# Patient Record
Sex: FEMALE | Race: WHITE | NOT HISPANIC OR LATINO | Employment: FULL TIME | URBAN - METROPOLITAN AREA
[De-identification: names, ages, dates, MRNs, and addresses within clinical notes are randomized per-mention and may not be internally consistent; named-entity substitution may affect disease eponyms.]

---

## 2020-11-05 ENCOUNTER — TRANSCRIBE ORDERS (OUTPATIENT)
Dept: ADMINISTRATIVE | Facility: HOSPITAL | Age: 25
End: 2020-11-05

## 2020-11-05 DIAGNOSIS — M77.8: Primary | ICD-10-CM

## 2021-01-04 ENCOUNTER — APPOINTMENT (OUTPATIENT)
Dept: NEUROLOGY | Facility: HOSPITAL | Age: 26
End: 2021-01-04

## 2021-02-09 ENCOUNTER — TRANSCRIBE ORDERS (OUTPATIENT)
Dept: PHYSICAL THERAPY | Facility: CLINIC | Age: 26
End: 2021-02-09

## 2021-02-09 ENCOUNTER — TREATMENT (OUTPATIENT)
Dept: PHYSICAL THERAPY | Facility: CLINIC | Age: 26
End: 2021-02-09

## 2021-02-09 DIAGNOSIS — M25.531 RIGHT WRIST PAIN: ICD-10-CM

## 2021-02-09 DIAGNOSIS — Z47.89 ORTHOPEDIC AFTERCARE: ICD-10-CM

## 2021-02-09 DIAGNOSIS — G56.01 CARPAL TUNNEL SYNDROME OF RIGHT WRIST: Primary | ICD-10-CM

## 2021-02-09 DIAGNOSIS — Z98.890 S/P CARPAL TUNNEL RELEASE: Primary | ICD-10-CM

## 2021-02-09 PROCEDURE — 97110 THERAPEUTIC EXERCISES: CPT | Performed by: PHYSICAL THERAPIST

## 2021-02-09 PROCEDURE — A9999 DME SUPPLY OR ACCESSORY, NOS: HCPCS | Performed by: PHYSICAL THERAPIST

## 2021-02-09 PROCEDURE — 97161 PT EVAL LOW COMPLEX 20 MIN: CPT | Performed by: PHYSICAL THERAPIST

## 2021-02-10 NOTE — PROGRESS NOTES
Physical Therapy Initial Evaluation and Plan of Care        Patient: Julia Blunt   : 1995  Diagnosis/ICD-10 Code:  Carpal tunnel syndrome of right wrist [G56.01]  Referring practitioner: Diane Hamilton MD  Date of Initial Visit: 2021  Today's Date: 2021  Patient seen for 1 sessions           Subjective Evaluation    History of Present Illness  Date of onset: 10/14/2020  Date of surgery: 2021  Mechanism of injury: Work related injury. Repetitive stress issue working at a [employer] in [place], [place] as . She has been employed there about 1 and 1/2 years.  Symptoms became severe in 2020.  EMG/NCS test + for CTS. S/P CTR on 2021.  Sent to PT for evaluation and instruction in HEP, provider thinks she should be OK with a HEP at this point.     Subjective comment: S/P right CTR   Patient Occupation:    Precautions and Work Restrictions: Light Duty 10 lbs restriction with both hand, 5 lbs with right handQuality of life: good    Pain  Current pain ratin  At best pain ratin  At worst pain ratin  Location: Intermittent Right anterior wrist/palm pain.   Quality: dull ache, throbbing and tight  Relieving factors: rest and support  Aggravating factors: lifting, movement, repetitive movement and sleeping  Progression: improved (Numbness improved since surgery)    Hand dominance: right             Objective          Observations     Right Wrist/Hand   Positive for edema (Mild at surgery site.) and incision (Eschar present, healing well).       Tenderness     Additional Tenderness Details  Volar wrist/surgery site mildly tender.    Neurological Testing     Sensation     Wrist/Hand     Right   Intact: light touch, pin prick and sharp/dull discrimination    Active Range of Motion   Cervical/Thoracic Spine   Normal active range of motion  Left Shoulder   Normal active range of motion    Right Shoulder   Normal active range of motion    Left Elbow    Normal active range of motion    Right Elbow   Normal active range of motion    Right Wrist   Wrist flexion: 35 degrees with pain  Wrist extension: 30 degrees with pain  Radial deviation: 16 degrees   Ulnar deviation: 17 degrees     Passive Range of Motion     Right Wrist   Wrist flexion: WFL  Wrist extension: WFL  Radial deviation: WFL  Ulnar deviation: WFL     Strength/Myotome Testing     Left Wrist/Hand      (2nd hand position)   lbs: 10    Thumb Strength  Key/Lateral Pinch     Trial 1: 5 lbs  Palmar/Three-Point Pinch     Trial 1: 9 lbs    Right Wrist/Hand   Wrist extension: 5  Wrist flexion: 4+  Radial deviation: 5  Ulnar deviation: 5     (2nd hand position)   lbs: 5    Thumb Strength   Key/Lateral Pinch     Trial 1: 4 lbs  Palmar/Three-Point Pinch     Trial 1: 4 lbs    Tests   Cervical     Right   Positive ULTT2.     Right Wrist/Hand   Negative Phalen's sign and Tinel's sign (medial nerve).           Assessment & Plan     Assessment  Impairments: abnormal or restricted ROM, activity intolerance, impaired physical strength, lacks appropriate home exercise program and pain with function  Assessment details: Pt. is a 25 year old female with a history of work related CTS symptoms on the right. She is S/P  carpal tunnel release surgery on 1/29/2021.  The patient is doing well post surgically.  Neurological exam is normal today.  Problems:  discomfort, decreased strength, tender incision, positive neural tension.  The order was for instruction in home program.  Prognosis: good  Prognosis details:   Goals to be met today.  1.  The pt is independent with HEP.   Goal Met.  Functional Limitations: carrying objects, lifting, pushing, uncomfortable because of pain and reaching overhead  Plan  Therapy options: will be seen for skilled physical therapy services  Planned therapy interventions: home exercise program  Duration in visits: 1  Treatment plan discussed with: patient  Plan details: The patient was sent for  evaluation and instruction in HEP following a CTR surgery.  The patient is independent with HEP and does not need further skilled rehab at this time.  The pt is considered discharged from our services at this time.        Manual Therapy:         mins  12745;  Therapeutic Exercise:    15     mins  65195;     Neuromuscular Christiano:        mins  74898;    Therapeutic Activity:          mins  91325;     Gait Training:           mins  36608;     Ultrasound:          mins  31893;    Electrical Stimulation:         mins  24620 ( );  Iontophoresis          mins 89666   Traction          mins  94629  Fluidotherapy          mins  23117  Dry Needling          mins self-pay  Traction          mins  32638    Timed Treatment:   20   mins   Total Treatment:     35   mins    PT SIGNATURE: Javad Lima, PT   DATE TREATMENT INITIATED: 2/10/2021    Initial Certification  Certification Period: 5/11/2021  I certify that the therapy services are furnished while this patient is under my care.  The services outlined above are required by this patient, and will be reviewed every 90 days.     PHYSICIAN: Diane Hamilton MD      DATE:     Please sign and return via fax to [phone].. Thank you, [facility].

## 2022-12-21 ENCOUNTER — TELEPHONE (OUTPATIENT)
Dept: OBSTETRICS AND GYNECOLOGY | Facility: CLINIC | Age: 27
End: 2022-12-21

## 2022-12-21 NOTE — TELEPHONE ENCOUNTER
Caller: Julia Blunt    Relationship to patient: Self    Best call back number: [phone]    Patient is needing:PT NEEDS TO BE SCHEDULED NEW OB W/ US LMP 11/3  PT PREFERS [facility].

## 2023-04-19 ENCOUNTER — TELEPHONE (OUTPATIENT)
Dept: OBSTETRICS AND GYNECOLOGY | Facility: CLINIC | Age: 28
End: 2023-04-19
Payer: OTHER MISCELLANEOUS

## 2023-04-19 NOTE — TELEPHONE ENCOUNTER
Discussed patient's request for transfer of care with Dr. Schmidt and he agreed. Left voice message informing patient that we have her scheduled with him for new OB visit @ 4611 4/26/23. Requested she call our office to confirm.

## 2023-04-19 NOTE — TELEPHONE ENCOUNTER
Pt calling to request appt as transfer new ob from [facility]. Her [facility] records are in Epic. Pt doesn't care what provider she sees if approved. Pt is currently about 24 wks.

## 2023-04-26 ENCOUNTER — INITIAL PRENATAL (OUTPATIENT)
Dept: OBSTETRICS AND GYNECOLOGY | Facility: CLINIC | Age: 28
End: 2023-04-26
Payer: COMMERCIAL

## 2023-04-26 VITALS — SYSTOLIC BLOOD PRESSURE: 98 MMHG | DIASTOLIC BLOOD PRESSURE: 60 MMHG | WEIGHT: 122.6 LBS

## 2023-04-26 DIAGNOSIS — O34.219 HISTORY OF CESAREAN SECTION COMPLICATING PREGNANCY: ICD-10-CM

## 2023-04-26 DIAGNOSIS — D50.8 IRON DEFICIENCY ANEMIA SECONDARY TO INADEQUATE DIETARY IRON INTAKE: ICD-10-CM

## 2023-04-26 DIAGNOSIS — Z34.90 PREGNANCY, UNSPECIFIED GESTATIONAL AGE: Primary | ICD-10-CM

## 2023-04-26 DIAGNOSIS — Z12.4 SCREENING FOR CERVICAL CANCER: ICD-10-CM

## 2023-04-26 DIAGNOSIS — Z87.59 HISTORY OF GESTATIONAL HYPERTENSION: ICD-10-CM

## 2023-04-26 DIAGNOSIS — Z34.82 PRENATAL CARE, SUBSEQUENT PREGNANCY, SECOND TRIMESTER: ICD-10-CM

## 2023-04-26 LAB
EXPIRATION DATE: 0
GLUCOSE UR STRIP-MCNC: NEGATIVE MG/DL
Lab: 0
PROT UR STRIP-MCNC: NEGATIVE MG/DL

## 2023-04-26 PROCEDURE — 0501F PRENATAL FLOW SHEET: CPT | Performed by: OBSTETRICS & GYNECOLOGY

## 2023-04-26 RX ORDER — PRENATAL VIT NO.126/IRON/FOLIC 28MG-0.8MG
TABLET ORAL DAILY
COMMUNITY

## 2023-04-26 RX ORDER — ASPIRIN 81 MG/1
81 TABLET, CHEWABLE ORAL DAILY
Qty: 30 TABLET | Refills: 6 | Status: SHIPPED | OUTPATIENT
Start: 2023-04-26

## 2023-04-26 RX ORDER — VITAMIN C, CALCIUM, IRON, VITAMIN D3, VITAMIN E, VITAMIN B1, VITAMIN B2, VITAMIN B3, VITAMIN B6, FOLIC ACID, IODINE, ZINC, COPPER, DOCUSATE SODIUM, DOCOSAHEXAENOIC ACID (DHA) 27-1-50 MG
1 KIT ORAL DAILY
Qty: 30 EACH | Refills: 11 | Status: SHIPPED | OUTPATIENT
Start: 2023-04-26 | End: 2024-04-25

## 2023-04-26 RX ORDER — FERROUS SULFATE 325(65) MG
325 TABLET ORAL
COMMUNITY

## 2023-04-26 NOTE — PROGRESS NOTES
Initial ob visit     CC- Here for care of pregnancy        Julia Blunt is a 27 y.o. female, , who presents for her first obstetrical visit and is a transfer from [facility].  Her last LMP was Patient's last menstrual period was 2022.. Her GALE is 8/10/2023, by Last Menstrual Period. Current GA is 24w6d.     Initial positive test date : 22, UPT        Her periods are: every 4 weeks and every 5 weeks  Prior obstetric issues: pre-eclampsia  Patient's past medical history is significant for: anxiety.  Family history of genetic issues (includes FOB): No  Prior infections concerning in pregnancy (Rash, fever in last 2 weeks): Yes, on left hand  Varicella Hx - unknown   Prior testing for Cystic Fibrosis Carrier or Sickle Cell Trait- None  Prepregnancy BMI - There is no height or weight on file to calculate BMI.  History of STD: no  Hx of HSV for patient or partner: no  Ultrasound Today: no    OB History    Para Term  AB Living   3         2   SAB IAB Ectopic Molar Multiple Live Births                    # Outcome Date GA Lbr Naman/2nd Weight Sex Delivery Anes PTL Lv   3 Current            2             1                 Additional Pertinent History   Last Pap :  Result: unknown  HPV: unknown      Last Completed Pap Smear     This patient has no relevant Health Maintenance data.        History of abnormal Pap smear: no  Family history of uterine, colon, breast, or ovarian cancer: no  Feelings of Anxiety or Depression: yes - anxiety  Tobacco Usage?: No   Alcohol/Drug Use?: NO  Over the age of 35 at delivery: no  Desires Genetic Screening: already done  Flu Status: Already given in current flu season    PMH    Current Outpatient Medications:   •  ferrous sulfate 325 (65 FE) MG tablet, Take 1 tablet by mouth Daily With Breakfast., Disp: , Rfl:   •  prenatal vitamin (prenatal, CLASSIC, vitamin) tablet, Take  by mouth Daily., Disp: , Rfl:   •  aspirin 81 MG  chewable tablet, Chew 1 tablet Daily., Disp: 30 tablet, Rfl: 6  •  hydrocortisone 2.5 % cream, Apply 1 application topically to the appropriate area as directed 3 (Three) Times a Day., Disp: 20 g, Rfl: 1  •  Prenat w/o A-FeCbGl-DSS-FA-DHA (PNV OB+DHA) 27-1 & 250 MG misc, Take 1 tablet by mouth Daily., Disp: 30 each, Rfl: 11     Past Medical History:   Diagnosis Date   • Anxiety    • Pregnancy     X2        Past Surgical History:   Procedure Laterality Date   • CARPAL TUNNEL RELEASE Bilateral    •  SECTION  2017   •  SECTION  2022   • CHOLECYSTECTOMY         Review of Systems   Review of Systems  Patient Reports: anxiety and left hand rash  Patient Denies: Nausea, Nausea and vomiting, Spotting, Heavy bleeding, Cramping and Fatigue  All systems reviewed and otherwise normal.    I have reviewed and agree with the HPI, ROS, and historical information as entered above. Duy Schmidt MD    BP 98/60   Wt 55.6 kg (122 lb 9.6 oz)   LMP 2022     The additional following portions of the patient's history were reviewed and updated as appropriate: allergies, current medications, past family history, past medical history, past social history, past surgical history and problem list.    Physical Exam  General:  well developed; well nourished  no acute distress   Chest/Respiratory: No labored breathing, normal respiratory effort, normal appearance, no respiratory noises noted   Heart:  normal rate, regular rhythm,  no murmurs, rubs, or gallops   Thyroid: normal to inspection and palpation   Breasts:  Not performed.   Abdomen: soft, non-tender; no masses  fundus firm and non-tender   Pelvis: Not performed.        Assessment and Plan    Problem List Items Addressed This Visit            Pregnancy - Primary    Overview     G3, P2   H/o C section x 2 at 37 wks for preeclampsia  ASA 81 mg rx   Transfer from  at 24w 6d           Iron deficiency anemia secondary to inadequate  dietary iron intake    Overview     2023; hematocrit 24.8%;  On iron sulfate 325 mg daily.    Iron profile consistent with iron deficiency.  3/13/2023; hematocrit improved to 30.6%         Relevant Medications    ferrous sulfate 325 (65 FE) MG tablet    Screening for cervical cancer    Overview     Last Pap ?? in [place]. Unsure date.          History of gestational hypertension    Overview     H/o Gestational HTN; delivered at 37 wks x 2  Has not been on aspirin this pregnancy.  2023 Rx ASA 81 mg daily.         History of  section complicating pregnancy    Overview     H/o C section x 2 at 37 wk; [place].   Need records.         Other Visit Diagnoses     Prenatal care, subsequent pregnancy, second trimester        Relevant Orders    POC Protein, Urine, Qualitative, Dipstick (Completed)    POC Glucose, Urine, Qualitative, Dipstick (Completed)          1. Pregnancy at 24w6d; transfer prenatal care from [facility].  Need prenatal records.    2. History of  x2.  Will need repeat .  3. History of gestational hypertension or preeclampsia.  Need records.  Rx ASA 81 mg daily.  4. Deficiency anemia.  Continue iron sulfate 325 mg daily.  5. Rx prenatal vitamins sent to pharmacy.  6. Reviewed routine prenatal care with the office and educational materials given  7. Lab(s) Ordered  8. Medication(s) Ordered  9. discussed baby aspirin from 10-36 weeks for prevention of preeclampsia    10. Rash on left fifth digit; Contact dermatitis; Rx 2.5% HC cream.   Return in about 3 weeks (around 2023) for One hour Glucose Screen.      Duy Schmidt MD  2023

## 2023-06-02 ENCOUNTER — ROUTINE PRENATAL (OUTPATIENT)
Dept: OBSTETRICS AND GYNECOLOGY | Facility: CLINIC | Age: 28
End: 2023-06-02

## 2023-06-02 VITALS — DIASTOLIC BLOOD PRESSURE: 62 MMHG | WEIGHT: 128.6 LBS | SYSTOLIC BLOOD PRESSURE: 102 MMHG

## 2023-06-02 DIAGNOSIS — D50.8 IRON DEFICIENCY ANEMIA SECONDARY TO INADEQUATE DIETARY IRON INTAKE: ICD-10-CM

## 2023-06-02 DIAGNOSIS — Z34.90 PREGNANCY, UNSPECIFIED GESTATIONAL AGE: Primary | ICD-10-CM

## 2023-06-02 DIAGNOSIS — Z34.93 PRENATAL CARE IN THIRD TRIMESTER: ICD-10-CM

## 2023-06-02 DIAGNOSIS — Z87.59 HISTORY OF GESTATIONAL HYPERTENSION: ICD-10-CM

## 2023-06-02 DIAGNOSIS — L29.9 ITCHING OF BOTH HANDS: ICD-10-CM

## 2023-06-02 DIAGNOSIS — O12.03 EDEMA IN PREGNANCY IN THIRD TRIMESTER: ICD-10-CM

## 2023-06-02 DIAGNOSIS — Z12.4 SCREENING FOR CERVICAL CANCER: ICD-10-CM

## 2023-06-02 DIAGNOSIS — O34.219 HISTORY OF CESAREAN SECTION COMPLICATING PREGNANCY: ICD-10-CM

## 2023-06-02 DIAGNOSIS — R51.9 NONINTRACTABLE HEADACHE, UNSPECIFIED CHRONICITY PATTERN, UNSPECIFIED HEADACHE TYPE: ICD-10-CM

## 2023-06-02 LAB
EXPIRATION DATE: 0
GLUCOSE UR STRIP-MCNC: NEGATIVE MG/DL
Lab: 0
PROT UR STRIP-MCNC: NEGATIVE MG/DL

## 2023-06-02 NOTE — PROGRESS NOTES
OB FOLLOW UP  CC- Here for care of pregnancy        Julia Blunt is a 27 y.o.  30w1d patient being seen today for her obstetrical follow up. Patient reports regular contractions everyday last week , swelling in the upper and lower extremities, patient states edema is painful. Headaches that are relieved with tylenol at times but not every time, visual changes are accompanied with a headache, itching in the hands and feet that is worse at night and vaginal pressure/pain.    Patient undergoing Glucola testing today. She is due for her testing at 3:15.       MBT: unknown patient was a 24 week transfer from , documents from  do not show that a ABO or RH was complete, will have these labs done today.   Rhogam: unknown if needed currently   28 week packet: reviewed with patient , counseled on fetal movement , pediatrician list reviewed, breast pump discussed and childbirth classes reviewed  TDAP: given today  Flu Status: Declines  Ultrasound Today: No    Her prenatal care is complicated by (and status) :    Patient Active Problem List   Diagnosis   • Pregnancy   • Iron deficiency anemia secondary to inadequate dietary iron intake   • Screening for cervical cancer   • History of gestational hypertension   • History of  section complicating pregnancy         ROS -   Patient Reports : Vision Changes, Headaches, Contractions and edema, itching in hands and feet.   Patient Denies: Loss of Fluid, Vaginal Spotting, Nausea , Vomiting  and Epigastric pain  Fetal Movement : normal    The additional following portions of the patient's history were reviewed and updated as appropriate: allergies, current medications, past family history, past medical history, past social history, past surgical history and problem list.    I have reviewed and agree with the HPI, ROS, and historical information as entered above. Anu Andrade, APRN      /62   Wt 58.3 kg (128 lb 9.6 oz)   LMP 2022         EXAM:      Prenatal Vitals  BP: 102/62  Weight: 58.3 kg (128 lb 9.6 oz)   Fetal Heart Rate: NST               Urine Glucose Read-only: Negative  Urine Protein Read-only: Negative       Assessment and Plan    Problem List Items Addressed This Visit        Genitourinary and Reproductive     Screening for cervical cancer    Overview     Last Pap ?? in [place]. Unsure date.             Gravid and     Pregnancy - Primary    Overview     G3, P2   H/o C section x 2 at 37 wks for preeclampsia  ASA 81 mg rx   Transfer from [facility] at 24w 6d           Relevant Orders    POC Glucose, Urine, Qualitative, Dipstick (Completed)    POC Protein, Urine, Qualitative, Dipstick (Completed)    History of gestational hypertension    Overview     H/o Gestational HTN; delivered at 37 wks x 2  Has not been on aspirin this pregnancy.  2023 Rx ASA 81 mg daily.         History of  section complicating pregnancy    Overview     H/o C section x 2 at 37 wk; [place].   Need records.             Hematology and Neoplasia    Iron deficiency anemia secondary to inadequate dietary iron intake    Overview     2023; hematocrit 24.8%;  On iron sulfate 325 mg daily.    Iron profile consistent with iron deficiency.  3/13/2023; hematocrit improved to 30.6%         Relevant Medications    ferrous sulfate 325 (65 FE) MG tablet   Other Visit Diagnoses     Prenatal care in third trimester        Relevant Orders    Bile Acids, Total    Antibody Screen    CBC (No Diff)    Comprehensive Metabolic Panel    Gestational Screen 1 Hr ([facility])    Itching of both hands        Relevant Orders    Bile Acids, Total    Nonintractable headache, unspecified chronicity pattern, unspecified headache type        Edema in pregnancy in third trimester              1. Pregnancy at 30w1d, NST >20 minutes baseline 140 reactive 15 x 15 no contractions/decelerations  2. 1 hr Glucola, CBC, and antibody screen today  and TDAP given today  3. Fetal movement/PTL or  Labor precautions  4. Lab(s) Ordered  5. Patient is on Prenatal vitamins  6. Reviewed Pre-eclampsia signs/symptoms  7. Discussed bASA for PIH prevention from 12 to 36wk  8. Activity and Exercise discussed.  Return for Next scheduled follow up.    Anu Andrade, APRN  06/02/2023

## 2023-06-04 LAB
ALBUMIN SERPL-MCNC: 3.4 G/DL (ref 3.9–5)
ALBUMIN/GLOB SERPL: 1.4 {RATIO} (ref 1.2–2.2)
ALP SERPL-CCNC: 101 IU/L (ref 44–121)
ALT SERPL-CCNC: 12 IU/L (ref 0–32)
AST SERPL-CCNC: 18 IU/L (ref 0–40)
BILE AC SERPL-SCNC: 6.8 UMOL/L (ref 0–10)
BILIRUB SERPL-MCNC: 0.4 MG/DL (ref 0–1.2)
BLD GP AB SCN SERPL QL: NEGATIVE
BUN SERPL-MCNC: 6 MG/DL (ref 6–20)
BUN/CREAT SERPL: 12 (ref 9–23)
CALCIUM SERPL-MCNC: 7.7 MG/DL (ref 8.7–10.2)
CHLORIDE SERPL-SCNC: 103 MMOL/L (ref 96–106)
CO2 SERPL-SCNC: 21 MMOL/L (ref 20–29)
CREAT SERPL-MCNC: 0.51 MG/DL (ref 0.57–1)
EGFRCR SERPLBLD CKD-EPI 2021: 130 ML/MIN/1.73
ERYTHROCYTE [DISTWIDTH] IN BLOOD BY AUTOMATED COUNT: 17.8 % (ref 11.7–15.4)
GLOBULIN SER CALC-MCNC: 2.5 G/DL (ref 1.5–4.5)
GLUCOSE 1H P 50 G GLC PO SERPL-MCNC: 135 MG/DL (ref 70–139)
GLUCOSE SERPL-MCNC: 136 MG/DL (ref 70–99)
HCT VFR BLD AUTO: 24.1 % (ref 34–46.6)
HGB BLD-MCNC: 7.6 G/DL (ref 11.1–15.9)
MCH RBC QN AUTO: 20.9 PG (ref 26.6–33)
MCHC RBC AUTO-ENTMCNC: 31.5 G/DL (ref 31.5–35.7)
MCV RBC AUTO: 66 FL (ref 79–97)
NRBC BLD AUTO-RTO: 1 % (ref 0–0)
PLATELET # BLD AUTO: 211 X10E3/UL (ref 150–450)
POTASSIUM SERPL-SCNC: 3.4 MMOL/L (ref 3.5–5.2)
PROT SERPL-MCNC: 5.9 G/DL (ref 6–8.5)
RBC # BLD AUTO: 3.63 X10E6/UL (ref 3.77–5.28)
SODIUM SERPL-SCNC: 136 MMOL/L (ref 134–144)
WBC # BLD AUTO: 7.5 X10E3/UL (ref 3.4–10.8)

## 2023-06-19 PROBLEM — E87.6 HYPOKALEMIA: Status: ACTIVE | Noted: 2023-06-19

## 2023-06-20 PROBLEM — E87.6 HYPOKALEMIA DUE TO EXCESSIVE GASTROINTESTINAL LOSS OF POTASSIUM: Status: ACTIVE | Noted: 2023-06-20

## 2023-07-14 PROBLEM — O26.849 UTERINE SIZE DATE DISCREPANCY PREGNANCY: Status: ACTIVE | Noted: 2023-07-14

## 2023-07-24 ENCOUNTER — ROUTINE PRENATAL (OUTPATIENT)
Dept: OBSTETRICS AND GYNECOLOGY | Facility: CLINIC | Age: 28
End: 2023-07-24
Payer: COMMERCIAL

## 2023-07-24 VITALS — SYSTOLIC BLOOD PRESSURE: 114 MMHG | DIASTOLIC BLOOD PRESSURE: 60 MMHG | BODY MASS INDEX: 25.39 KG/M2 | WEIGHT: 134.4 LBS

## 2023-07-24 DIAGNOSIS — Z34.93 PRENATAL CARE IN THIRD TRIMESTER: ICD-10-CM

## 2023-07-24 DIAGNOSIS — O34.219 HISTORY OF CESAREAN SECTION COMPLICATING PREGNANCY: ICD-10-CM

## 2023-07-24 DIAGNOSIS — O26.849 UTERINE SIZE DATE DISCREPANCY PREGNANCY: ICD-10-CM

## 2023-07-24 DIAGNOSIS — Z34.90 PREGNANCY, UNSPECIFIED GESTATIONAL AGE: Primary | ICD-10-CM

## 2023-07-24 DIAGNOSIS — Z3A.37 37 WEEKS GESTATION OF PREGNANCY: ICD-10-CM

## 2023-07-24 DIAGNOSIS — D50.8 IRON DEFICIENCY ANEMIA SECONDARY TO INADEQUATE DIETARY IRON INTAKE: ICD-10-CM

## 2023-07-24 DIAGNOSIS — Z87.59 HISTORY OF GESTATIONAL HYPERTENSION: ICD-10-CM

## 2023-07-24 LAB
EXPIRATION DATE: 0
GLUCOSE UR STRIP-MCNC: NEGATIVE MG/DL
Lab: 0
PROT UR STRIP-MCNC: NEGATIVE MG/DL

## 2023-07-24 PROCEDURE — 0502F SUBSEQUENT PRENATAL CARE: CPT | Performed by: OBSTETRICS & GYNECOLOGY

## 2023-07-24 NOTE — PROGRESS NOTES
"    OB FOLLOW UP  CC- Here for care of pregnancy        Julia Blunt is a 28 y.o.  37w4d patient being seen today for her obstetrical follow up visit. Patient reports daily headaches with vision changes (\"flashing lights with floating orbs\"); no medication helps alleviate. Trace BLE edema today, but when patient is on her feet, it is worse (and painful). Also reports daily heartburn that varies in strength (depending on diet), vulvar swelling, increased pelvic pressure, [facility], and \"intense\" lower back pains that radiate down BLE (at worst, an 8 on 0-10 pain scale).    Her prenatal care is complicated by (and status) :   Patient Active Problem List   Diagnosis    Pregnancy    Iron deficiency anemia secondary to inadequate dietary iron intake    Screening for cervical cancer    History of gestational hypertension    History of  section complicating pregnancy    Hypokalemia    Hypokalemia due to excessive gastrointestinal loss of potassium    Uterine size date discrepancy pregnancy       GBS Status:   Group B Strep Culture   Date Value Ref Range Status   2023 No Group B Streptococcus isolated  Final         Allergies   Allergen Reactions    Nickel Irritability          Flu Status:  not currently flu season  Her Delivery Plan is:  repeat  8/3 at 1030am     US today: yes  Non Stress Test: No.    ROS -   Patient Reports :  see above  Patient Denies: Loss of Fluid, Vaginal Spotting, Nausea , and Vomiting   Fetal Movement : normal  All other systems reviewed and are negative.       The additional following portions of the patient's history were reviewed and updated as appropriate: allergies, current medications, past family history, past medical history, past social history, past surgical history, and problem list.    I have reviewed and agree with the HPI, ROS, and historical information as entered above. Duy Schmidt MD       EXAM:     Prenatal Vitals  BP: 114/60  Weight: 61 kg " (134 lb 6.4 oz)   Fetal Heart Rate: 132 US              Urine Glucose Read-only: Negative  Urine Protein Read-only: Negative           Assessment and Plan    Problem List Items Addressed This Visit              Pregnancy - Primary    Overview     G3, P2   H/o C section x 2 at 37 wks for preeclampsia  ASA 81 mg rx   Transfer from  at 24w 6d           Relevant Orders    POC Glucose, Urine, Qualitative, Dipstick (Completed)    POC Protein, Urine, Qualitative, Dipstick (Completed)    Iron deficiency anemia secondary to inadequate dietary iron intake    Overview     2023; hematocrit 24.8%;  On iron sulfate 325 mg daily.    Iron profile consistent with iron deficiency.  3/13/2023; hematocrit improved to 30.6%  2023; hematocrit 26.1.  IV iron infusion 6/19/2023 x2  Increase iron to twice daily.         Relevant Medications    ferrous sulfate 325 (65 FE) MG tablet    History of gestational hypertension    Overview     H/o Gestational HTN; delivered at 37 wks x 2  Has not been on aspirin this pregnancy.  2023 Rx ASA 81 mg daily.         History of  section complicating pregnancy    Overview     H/o C section x 2 at 37 wk; [place].   Need records.          Uterine size date discrepancy pregnancy    Overview     2023 measures small for dates.  U/s scheduled.           Other Visit Diagnoses       Prenatal care in third trimester        Relevant Orders    POC Glucose, Urine, Qualitative, Dipstick (Completed)    POC Protein, Urine, Qualitative, Dipstick (Completed)            Pregnancy at 37w4d  LGA on u/s today; EFW 71%; AC 98%.   Fetal status reassuring. BPP 8/8 with normal TIERNEY.   Reviewed Pre-eclampsia signs/symptoms  Reviewed upcoming c/s and PAT instructions with patient. Arrival time and NPO status reviewed.   Delivery options reviewed with patient  Signs of labor reviewed  Kick counts reviewed  Activity and Exercise discussed.  Return in about 1 week (around 2023) for Next  scheduled follow up.    Duy Schmidt MD  07/24/2023

## 2023-07-31 ENCOUNTER — ROUTINE PRENATAL (OUTPATIENT)
Dept: OBSTETRICS AND GYNECOLOGY | Facility: CLINIC | Age: 28
End: 2023-07-31
Payer: COMMERCIAL

## 2023-07-31 ENCOUNTER — PRE-ADMISSION TESTING (OUTPATIENT)
Dept: PREADMISSION TESTING | Facility: HOSPITAL | Age: 28
End: 2023-07-31
Payer: COMMERCIAL

## 2023-07-31 ENCOUNTER — PREP FOR SURGERY (OUTPATIENT)
Dept: OTHER | Facility: HOSPITAL | Age: 28
End: 2023-07-31
Payer: COMMERCIAL

## 2023-07-31 VITALS — BODY MASS INDEX: 26.26 KG/M2 | SYSTOLIC BLOOD PRESSURE: 110 MMHG | WEIGHT: 139 LBS | DIASTOLIC BLOOD PRESSURE: 64 MMHG

## 2023-07-31 VITALS — WEIGHT: 137.57 LBS | BODY MASS INDEX: 27.73 KG/M2 | HEIGHT: 59 IN

## 2023-07-31 DIAGNOSIS — Z98.891 PREVIOUS CESAREAN SECTION: Primary | ICD-10-CM

## 2023-07-31 DIAGNOSIS — Z3A.38 38 WEEKS GESTATION OF PREGNANCY: Primary | ICD-10-CM

## 2023-07-31 DIAGNOSIS — Z87.59 HISTORY OF GESTATIONAL HYPERTENSION: ICD-10-CM

## 2023-07-31 DIAGNOSIS — L29.9 ITCHING OF BOTH HANDS: ICD-10-CM

## 2023-07-31 DIAGNOSIS — R51.9 NONINTRACTABLE HEADACHE, UNSPECIFIED CHRONICITY PATTERN, UNSPECIFIED HEADACHE TYPE: ICD-10-CM

## 2023-07-31 DIAGNOSIS — Z98.891 PREVIOUS CESAREAN SECTION: ICD-10-CM

## 2023-07-31 DIAGNOSIS — Z34.93 PRENATAL CARE IN THIRD TRIMESTER: ICD-10-CM

## 2023-07-31 DIAGNOSIS — O34.219 HISTORY OF CESAREAN SECTION COMPLICATING PREGNANCY: ICD-10-CM

## 2023-07-31 LAB
ABO GROUP BLD: NORMAL
ALP SERPL-CCNC: 276 U/L (ref 39–117)
ALT SERPL W P-5'-P-CCNC: 13 U/L (ref 1–33)
AST SERPL-CCNC: 21 U/L (ref 1–32)
BILE AC SERPL-SCNC: 6 UMOL/L (ref 0–10)
BILIRUB SERPL-MCNC: 0.4 MG/DL (ref 0–1.2)
BLD GP AB SCN SERPL QL: NEGATIVE
CREAT SERPL-MCNC: 0.45 MG/DL (ref 0.57–1)
DEPRECATED RDW RBC AUTO: 68.2 FL (ref 37–54)
ERYTHROCYTE [DISTWIDTH] IN BLOOD BY AUTOMATED COUNT: 25.2 % (ref 12.3–15.4)
EXPIRATION DATE: 0
GLUCOSE UR STRIP-MCNC: NEGATIVE MG/DL
HCT VFR BLD AUTO: 34.7 % (ref 34–46.6)
HGB BLD-MCNC: 9.9 G/DL (ref 12–15.9)
LDH SERPL-CCNC: 295 U/L (ref 135–214)
Lab: 0
MCH RBC QN AUTO: 22 PG (ref 26.6–33)
MCHC RBC AUTO-ENTMCNC: 28.5 G/DL (ref 31.5–35.7)
MCV RBC AUTO: 76.9 FL (ref 79–97)
PLATELET # BLD AUTO: 211 10*3/MM3 (ref 140–450)
PMV BLD AUTO: 10.5 FL (ref 6–12)
PROT UR STRIP-MCNC: NEGATIVE MG/DL
RBC # BLD AUTO: 4.51 10*6/MM3 (ref 3.77–5.28)
RH BLD: POSITIVE
T&S EXPIRATION DATE: NORMAL
URATE SERPL-MCNC: 3.5 MG/DL (ref 2.4–5.7)
WBC NRBC COR # BLD: 7.9 10*3/MM3 (ref 3.4–10.8)

## 2023-07-31 PROCEDURE — 84550 ASSAY OF BLOOD/URIC ACID: CPT

## 2023-07-31 PROCEDURE — 82565 ASSAY OF CREATININE: CPT

## 2023-07-31 PROCEDURE — 84450 TRANSFERASE (AST) (SGOT): CPT

## 2023-07-31 PROCEDURE — 85027 COMPLETE CBC AUTOMATED: CPT

## 2023-07-31 PROCEDURE — 86901 BLOOD TYPING SEROLOGIC RH(D): CPT

## 2023-07-31 PROCEDURE — 82239 BILE ACIDS TOTAL: CPT

## 2023-07-31 PROCEDURE — 59025 FETAL NON-STRESS TEST: CPT

## 2023-07-31 PROCEDURE — 84460 ALANINE AMINO (ALT) (SGPT): CPT

## 2023-07-31 PROCEDURE — 82247 BILIRUBIN TOTAL: CPT

## 2023-07-31 PROCEDURE — 36415 COLL VENOUS BLD VENIPUNCTURE: CPT

## 2023-07-31 PROCEDURE — 86850 RBC ANTIBODY SCREEN: CPT

## 2023-07-31 PROCEDURE — 84075 ASSAY ALKALINE PHOSPHATASE: CPT

## 2023-07-31 PROCEDURE — 83615 LACTATE (LD) (LDH) ENZYME: CPT

## 2023-07-31 PROCEDURE — 86900 BLOOD TYPING SEROLOGIC ABO: CPT

## 2023-07-31 PROCEDURE — 0502F SUBSEQUENT PRENATAL CARE: CPT

## 2023-07-31 RX ORDER — SODIUM CHLORIDE, SODIUM LACTATE, POTASSIUM CHLORIDE, CALCIUM CHLORIDE 600; 310; 30; 20 MG/100ML; MG/100ML; MG/100ML; MG/100ML
125 INJECTION, SOLUTION INTRAVENOUS CONTINUOUS
Status: CANCELLED | OUTPATIENT
Start: 2023-07-31

## 2023-07-31 RX ORDER — OXYTOCIN/0.9 % SODIUM CHLORIDE 30/500 ML
85 PLASTIC BAG, INJECTION (ML) INTRAVENOUS ONCE
Status: CANCELLED | OUTPATIENT
Start: 2023-07-31 | End: 2023-07-31

## 2023-07-31 RX ORDER — CARBOPROST TROMETHAMINE 250 UG/ML
250 INJECTION, SOLUTION INTRAMUSCULAR AS NEEDED
Status: CANCELLED | OUTPATIENT
Start: 2023-07-31

## 2023-07-31 RX ORDER — KETOROLAC TROMETHAMINE 15 MG/ML
30 INJECTION, SOLUTION INTRAMUSCULAR; INTRAVENOUS ONCE
Status: CANCELLED | OUTPATIENT
Start: 2023-07-31 | End: 2023-07-31

## 2023-07-31 RX ORDER — MISOPROSTOL 100 UG/1
800 TABLET ORAL AS NEEDED
Status: CANCELLED | OUTPATIENT
Start: 2023-07-31

## 2023-07-31 RX ORDER — METHYLERGONOVINE MALEATE 0.2 MG/ML
200 INJECTION INTRAVENOUS ONCE AS NEEDED
Status: CANCELLED | OUTPATIENT
Start: 2023-07-31

## 2023-07-31 RX ORDER — OXYTOCIN/0.9 % SODIUM CHLORIDE 30/500 ML
650 PLASTIC BAG, INJECTION (ML) INTRAVENOUS ONCE
Status: CANCELLED | OUTPATIENT
Start: 2023-07-31 | End: 2023-07-31

## 2023-07-31 RX ORDER — ACETAMINOPHEN 500 MG
1000 TABLET ORAL ONCE
Status: CANCELLED | OUTPATIENT
Start: 2023-07-31 | End: 2023-07-31

## 2023-07-31 RX ORDER — TRISODIUM CITRATE DIHYDRATE AND CITRIC ACID MONOHYDRATE 500; 334 MG/5ML; MG/5ML
30 SOLUTION ORAL ONCE
Status: CANCELLED | OUTPATIENT
Start: 2023-07-31 | End: 2023-07-31

## 2023-08-02 ENCOUNTER — ANESTHESIA EVENT (OUTPATIENT)
Dept: LABOR AND DELIVERY | Facility: HOSPITAL | Age: 28
End: 2023-08-02
Payer: COMMERCIAL

## 2023-08-02 ENCOUNTER — ROUTINE PRENATAL (OUTPATIENT)
Dept: OBSTETRICS AND GYNECOLOGY | Facility: CLINIC | Age: 28
End: 2023-08-02
Payer: COMMERCIAL

## 2023-08-02 VITALS — WEIGHT: 139 LBS | BODY MASS INDEX: 28.07 KG/M2 | SYSTOLIC BLOOD PRESSURE: 108 MMHG | DIASTOLIC BLOOD PRESSURE: 62 MMHG

## 2023-08-02 DIAGNOSIS — O26.849 UTERINE SIZE DATE DISCREPANCY PREGNANCY: ICD-10-CM

## 2023-08-02 DIAGNOSIS — Z3A.38 38 WEEKS GESTATION OF PREGNANCY: Primary | ICD-10-CM

## 2023-08-02 DIAGNOSIS — Z87.59 HISTORY OF GESTATIONAL HYPERTENSION: ICD-10-CM

## 2023-08-02 DIAGNOSIS — D50.8 IRON DEFICIENCY ANEMIA SECONDARY TO INADEQUATE DIETARY IRON INTAKE: ICD-10-CM

## 2023-08-02 DIAGNOSIS — Z12.4 SCREENING FOR CERVICAL CANCER: ICD-10-CM

## 2023-08-02 DIAGNOSIS — O34.219 HISTORY OF CESAREAN SECTION COMPLICATING PREGNANCY: ICD-10-CM

## 2023-08-02 LAB
EXPIRATION DATE: 0
GLUCOSE UR STRIP-MCNC: NEGATIVE MG/DL
Lab: 0
PROT UR STRIP-MCNC: NEGATIVE MG/DL

## 2023-08-02 RX ORDER — SENNOSIDES 8.6 MG
650 CAPSULE ORAL EVERY 8 HOURS PRN
COMMUNITY
End: 2023-08-05 | Stop reason: HOSPADM

## 2023-08-03 ENCOUNTER — HOSPITAL ENCOUNTER (INPATIENT)
Facility: HOSPITAL | Age: 28
LOS: 2 days | Discharge: HOME | End: 2023-08-05
Attending: OBSTETRICS & GYNECOLOGY | Admitting: OBSTETRICS & GYNECOLOGY
Payer: COMMERCIAL

## 2023-08-03 ENCOUNTER — ANESTHESIA (OUTPATIENT)
Dept: LABOR AND DELIVERY | Facility: HOSPITAL | Age: 28
End: 2023-08-03
Payer: COMMERCIAL

## 2023-08-03 DIAGNOSIS — Z98.891 PREVIOUS CESAREAN SECTION: ICD-10-CM

## 2023-08-03 PROCEDURE — 25010000002 CEFAZOLIN IN DEXTROSE 2-4 GM/100ML-% SOLUTION

## 2023-08-03 PROCEDURE — 25010000002 KETOROLAC TROMETHAMINE PER 15 MG: Performed by: OBSTETRICS & GYNECOLOGY

## 2023-08-03 PROCEDURE — 0 MORPHINE PER 10 MG: Performed by: NURSE ANESTHETIST, CERTIFIED REGISTERED

## 2023-08-03 PROCEDURE — 25010000002 KETOROLAC TROMETHAMINE PER 15 MG

## 2023-08-03 PROCEDURE — 25010000002 ONDANSETRON PER 1 MG: Performed by: NURSE ANESTHETIST, CERTIFIED REGISTERED

## 2023-08-03 PROCEDURE — 25010000002 OXYTOCIN PER 10 UNITS: Performed by: NURSE ANESTHETIST, CERTIFIED REGISTERED

## 2023-08-03 PROCEDURE — 59510 CESAREAN DELIVERY: CPT | Performed by: OBSTETRICS & GYNECOLOGY

## 2023-08-03 PROCEDURE — 63710000001 DIPHENHYDRAMINE PER 50 MG: Performed by: NURSE ANESTHETIST, CERTIFIED REGISTERED

## 2023-08-03 PROCEDURE — 59025 FETAL NON-STRESS TEST: CPT

## 2023-08-03 PROCEDURE — C1765 ADHESION BARRIER: HCPCS | Performed by: OBSTETRICS & GYNECOLOGY

## 2023-08-03 PROCEDURE — 25010000002 FENTANYL CITRATE (PF) 50 MCG/ML SOLUTION: Performed by: NURSE ANESTHETIST, CERTIFIED REGISTERED

## 2023-08-03 PROCEDURE — 25010000002 DIPHENHYDRAMINE PER 50 MG: Performed by: NURSE ANESTHETIST, CERTIFIED REGISTERED

## 2023-08-03 PROCEDURE — 25010000002 MIDAZOLAM PER 1 MG: Performed by: NURSE ANESTHETIST, CERTIFIED REGISTERED

## 2023-08-03 DEVICE — ABSORBABLE ADHESION BARRIER
Type: IMPLANTABLE DEVICE | Site: UTERUS | Status: FUNCTIONAL
Brand: GYNECARE INTERCEED

## 2023-08-03 RX ORDER — CEFAZOLIN SODIUM 2 G/100ML
2 INJECTION, SOLUTION INTRAVENOUS ONCE
Status: COMPLETED | OUTPATIENT
Start: 2023-08-03 | End: 2023-08-03

## 2023-08-03 RX ORDER — TRISODIUM CITRATE DIHYDRATE AND CITRIC ACID MONOHYDRATE 500; 334 MG/5ML; MG/5ML
30 SOLUTION ORAL ONCE
Status: COMPLETED | OUTPATIENT
Start: 2023-08-03 | End: 2023-08-03

## 2023-08-03 RX ORDER — NALOXONE HCL 0.4 MG/ML
0.4 VIAL (ML) INJECTION
Status: ACTIVE | OUTPATIENT
Start: 2023-08-03 | End: 2023-08-04

## 2023-08-03 RX ORDER — ONDANSETRON 2 MG/ML
INJECTION INTRAMUSCULAR; INTRAVENOUS AS NEEDED
Status: DISCONTINUED | OUTPATIENT
Start: 2023-08-03 | End: 2023-08-03 | Stop reason: SURG

## 2023-08-03 RX ORDER — OXYCODONE HYDROCHLORIDE 5 MG/1
5 TABLET ORAL EVERY 4 HOURS PRN
Status: DISCONTINUED | OUTPATIENT
Start: 2023-08-03 | End: 2023-08-05 | Stop reason: HOSPADM

## 2023-08-03 RX ORDER — PROMETHAZINE HYDROCHLORIDE 12.5 MG/1
12.5 TABLET ORAL EVERY 4 HOURS PRN
Status: DISCONTINUED | OUTPATIENT
Start: 2023-08-03 | End: 2023-08-05 | Stop reason: HOSPADM

## 2023-08-03 RX ORDER — HYDROCORTISONE 25 MG/G
1 CREAM TOPICAL AS NEEDED
Status: DISCONTINUED | OUTPATIENT
Start: 2023-08-03 | End: 2023-08-05 | Stop reason: HOSPADM

## 2023-08-03 RX ORDER — ACETAMINOPHEN 325 MG/1
650 TABLET ORAL EVERY 6 HOURS
Status: DISCONTINUED | OUTPATIENT
Start: 2023-08-04 | End: 2023-08-05 | Stop reason: HOSPADM

## 2023-08-03 RX ORDER — METHYLERGONOVINE MALEATE 0.2 MG/ML
200 INJECTION INTRAVENOUS ONCE AS NEEDED
Status: DISCONTINUED | OUTPATIENT
Start: 2023-08-03 | End: 2023-08-03 | Stop reason: HOSPADM

## 2023-08-03 RX ORDER — OXYTOCIN/0.9 % SODIUM CHLORIDE 30/500 ML
PLASTIC BAG, INJECTION (ML) INTRAVENOUS AS NEEDED
Status: DISCONTINUED | OUTPATIENT
Start: 2023-08-03 | End: 2023-08-03 | Stop reason: SURG

## 2023-08-03 RX ORDER — NALBUPHINE HYDROCHLORIDE 10 MG/ML
3 INJECTION, SOLUTION INTRAMUSCULAR; INTRAVENOUS; SUBCUTANEOUS ONCE AS NEEDED
Status: DISCONTINUED | OUTPATIENT
Start: 2023-08-03 | End: 2023-08-03 | Stop reason: HOSPADM

## 2023-08-03 RX ORDER — OXYTOCIN/0.9 % SODIUM CHLORIDE 30/500 ML
650 PLASTIC BAG, INJECTION (ML) INTRAVENOUS ONCE
Status: DISCONTINUED | OUTPATIENT
Start: 2023-08-03 | End: 2023-08-03 | Stop reason: HOSPADM

## 2023-08-03 RX ORDER — ACETAMINOPHEN 500 MG
1000 TABLET ORAL ONCE
Status: COMPLETED | OUTPATIENT
Start: 2023-08-03 | End: 2023-08-03

## 2023-08-03 RX ORDER — OXYCODONE HYDROCHLORIDE 10 MG/1
10 TABLET ORAL EVERY 4 HOURS PRN
Status: DISCONTINUED | OUTPATIENT
Start: 2023-08-03 | End: 2023-08-05 | Stop reason: HOSPADM

## 2023-08-03 RX ORDER — GLYCOPYRROLATE 0.2 MG/ML
INJECTION INTRAMUSCULAR; INTRAVENOUS AS NEEDED
Status: DISCONTINUED | OUTPATIENT
Start: 2023-08-03 | End: 2023-08-03 | Stop reason: SURG

## 2023-08-03 RX ORDER — OXYTOCIN/0.9 % SODIUM CHLORIDE 30/500 ML
85 PLASTIC BAG, INJECTION (ML) INTRAVENOUS ONCE
Status: DISCONTINUED | OUTPATIENT
Start: 2023-08-03 | End: 2023-08-03 | Stop reason: HOSPADM

## 2023-08-03 RX ORDER — IBUPROFEN 600 MG/1
600 TABLET ORAL EVERY 6 HOURS
Status: DISCONTINUED | OUTPATIENT
Start: 2023-08-04 | End: 2023-08-05 | Stop reason: HOSPADM

## 2023-08-03 RX ORDER — DIPHENHYDRAMINE HYDROCHLORIDE 50 MG/ML
25 INJECTION INTRAMUSCULAR; INTRAVENOUS EVERY 4 HOURS PRN
Status: DISCONTINUED | OUTPATIENT
Start: 2023-08-03 | End: 2023-08-05 | Stop reason: HOSPADM

## 2023-08-03 RX ORDER — ACETAMINOPHEN 500 MG
1000 TABLET ORAL EVERY 6 HOURS
Status: COMPLETED | OUTPATIENT
Start: 2023-08-03 | End: 2023-08-04

## 2023-08-03 RX ORDER — CALCIUM CARBONATE 500 MG/1
1 TABLET, CHEWABLE ORAL EVERY 4 HOURS PRN
Status: DISCONTINUED | OUTPATIENT
Start: 2023-08-03 | End: 2023-08-05 | Stop reason: HOSPADM

## 2023-08-03 RX ORDER — DIPHENHYDRAMINE HCL 25 MG
25 CAPSULE ORAL EVERY 4 HOURS PRN
Status: DISCONTINUED | OUTPATIENT
Start: 2023-08-03 | End: 2023-08-03 | Stop reason: SDUPTHER

## 2023-08-03 RX ORDER — PHENYLEPHRINE HCL IN 0.9% NACL 1 MG/10 ML
SYRINGE (ML) INTRAVENOUS AS NEEDED
Status: DISCONTINUED | OUTPATIENT
Start: 2023-08-03 | End: 2023-08-03 | Stop reason: SURG

## 2023-08-03 RX ORDER — KETOROLAC TROMETHAMINE 15 MG/ML
15 INJECTION, SOLUTION INTRAMUSCULAR; INTRAVENOUS EVERY 6 HOURS
Status: DISCONTINUED | OUTPATIENT
Start: 2023-08-03 | End: 2023-08-03

## 2023-08-03 RX ORDER — SODIUM CHLORIDE, SODIUM LACTATE, POTASSIUM CHLORIDE, CALCIUM CHLORIDE 600; 310; 30; 20 MG/100ML; MG/100ML; MG/100ML; MG/100ML
125 INJECTION, SOLUTION INTRAVENOUS CONTINUOUS
Status: DISCONTINUED | OUTPATIENT
Start: 2023-08-03 | End: 2023-08-05 | Stop reason: HOSPADM

## 2023-08-03 RX ORDER — MISOPROSTOL 200 UG/1
800 TABLET ORAL AS NEEDED
Status: DISCONTINUED | OUTPATIENT
Start: 2023-08-03 | End: 2023-08-03 | Stop reason: HOSPADM

## 2023-08-03 RX ORDER — DOCUSATE SODIUM 100 MG/1
100 CAPSULE, LIQUID FILLED ORAL 2 TIMES DAILY PRN
Status: DISCONTINUED | OUTPATIENT
Start: 2023-08-03 | End: 2023-08-05 | Stop reason: HOSPADM

## 2023-08-03 RX ORDER — FENTANYL CITRATE 50 UG/ML
50 INJECTION, SOLUTION INTRAMUSCULAR; INTRAVENOUS
Status: DISCONTINUED | OUTPATIENT
Start: 2023-08-03 | End: 2023-08-03 | Stop reason: HOSPADM

## 2023-08-03 RX ORDER — FENTANYL CITRATE 50 UG/ML
INJECTION, SOLUTION INTRAMUSCULAR; INTRAVENOUS AS NEEDED
Status: DISCONTINUED | OUTPATIENT
Start: 2023-08-03 | End: 2023-08-03 | Stop reason: SURG

## 2023-08-03 RX ORDER — ALUMINA, MAGNESIA, AND SIMETHICONE 2400; 2400; 240 MG/30ML; MG/30ML; MG/30ML
15 SUSPENSION ORAL EVERY 4 HOURS PRN
Status: DISCONTINUED | OUTPATIENT
Start: 2023-08-03 | End: 2023-08-05 | Stop reason: HOSPADM

## 2023-08-03 RX ORDER — KETOROLAC TROMETHAMINE 30 MG/ML
15 INJECTION, SOLUTION INTRAMUSCULAR; INTRAVENOUS EVERY 6 HOURS
Status: COMPLETED | OUTPATIENT
Start: 2023-08-03 | End: 2023-08-04

## 2023-08-03 RX ORDER — CARBOPROST TROMETHAMINE 250 UG/ML
250 INJECTION, SOLUTION INTRAMUSCULAR AS NEEDED
Status: DISCONTINUED | OUTPATIENT
Start: 2023-08-03 | End: 2023-08-05 | Stop reason: HOSPADM

## 2023-08-03 RX ORDER — OXYTOCIN 10 [USP'U]/ML
INJECTION, SOLUTION INTRAMUSCULAR; INTRAVENOUS AS NEEDED
Status: DISCONTINUED | OUTPATIENT
Start: 2023-08-03 | End: 2023-08-03 | Stop reason: SURG

## 2023-08-03 RX ORDER — ONDANSETRON 2 MG/ML
4 INJECTION INTRAMUSCULAR; INTRAVENOUS ONCE AS NEEDED
Status: DISCONTINUED | OUTPATIENT
Start: 2023-08-03 | End: 2023-08-03 | Stop reason: HOSPADM

## 2023-08-03 RX ORDER — KETOROLAC TROMETHAMINE 30 MG/ML
30 INJECTION, SOLUTION INTRAMUSCULAR; INTRAVENOUS ONCE
Status: COMPLETED | OUTPATIENT
Start: 2023-08-03 | End: 2023-08-03

## 2023-08-03 RX ORDER — PRENATAL VIT/IRON FUM/FOLIC AC 27MG-0.8MG
1 TABLET ORAL DAILY
Status: DISCONTINUED | OUTPATIENT
Start: 2023-08-03 | End: 2023-08-05 | Stop reason: HOSPADM

## 2023-08-03 RX ORDER — CARBOPROST TROMETHAMINE 250 UG/ML
250 INJECTION, SOLUTION INTRAMUSCULAR AS NEEDED
Status: DISCONTINUED | OUTPATIENT
Start: 2023-08-03 | End: 2023-08-03 | Stop reason: HOSPADM

## 2023-08-03 RX ORDER — SIMETHICONE 80 MG
80 TABLET,CHEWABLE ORAL 4 TIMES DAILY PRN
Status: DISCONTINUED | OUTPATIENT
Start: 2023-08-03 | End: 2023-08-05 | Stop reason: HOSPADM

## 2023-08-03 RX ORDER — ONDANSETRON 2 MG/ML
4 INJECTION INTRAMUSCULAR; INTRAVENOUS EVERY 6 HOURS PRN
Status: DISCONTINUED | OUTPATIENT
Start: 2023-08-03 | End: 2023-08-05 | Stop reason: HOSPADM

## 2023-08-03 RX ORDER — METHYLERGONOVINE MALEATE 0.2 MG/ML
200 INJECTION INTRAVENOUS ONCE AS NEEDED
Status: DISCONTINUED | OUTPATIENT
Start: 2023-08-03 | End: 2023-08-05 | Stop reason: HOSPADM

## 2023-08-03 RX ORDER — BUPIVACAINE HYDROCHLORIDE 7.5 MG/ML
INJECTION, SOLUTION INTRASPINAL AS NEEDED
Status: DISCONTINUED | OUTPATIENT
Start: 2023-08-03 | End: 2023-08-03 | Stop reason: SURG

## 2023-08-03 RX ORDER — DIPHENHYDRAMINE HCL 25 MG
25 CAPSULE ORAL EVERY 4 HOURS PRN
Status: DISCONTINUED | OUTPATIENT
Start: 2023-08-03 | End: 2023-08-05 | Stop reason: HOSPADM

## 2023-08-03 RX ORDER — IBUPROFEN 600 MG/1
600 TABLET ORAL EVERY 6 HOURS
Status: DISCONTINUED | OUTPATIENT
Start: 2023-08-04 | End: 2023-08-03

## 2023-08-03 RX ORDER — MORPHINE SULFATE 0.5 MG/ML
INJECTION, SOLUTION EPIDURAL; INTRATHECAL; INTRAVENOUS AS NEEDED
Status: DISCONTINUED | OUTPATIENT
Start: 2023-08-03 | End: 2023-08-03 | Stop reason: SURG

## 2023-08-03 RX ORDER — FAMOTIDINE 10 MG/ML
INJECTION, SOLUTION INTRAVENOUS AS NEEDED
Status: DISCONTINUED | OUTPATIENT
Start: 2023-08-03 | End: 2023-08-03 | Stop reason: SURG

## 2023-08-03 RX ORDER — MIDAZOLAM HYDROCHLORIDE 1 MG/ML
INJECTION INTRAMUSCULAR; INTRAVENOUS AS NEEDED
Status: DISCONTINUED | OUTPATIENT
Start: 2023-08-03 | End: 2023-08-03 | Stop reason: SURG

## 2023-08-03 RX ADMIN — SODIUM CHLORIDE, POTASSIUM CHLORIDE, SODIUM LACTATE AND CALCIUM CHLORIDE: 600; 310; 30; 20 INJECTION, SOLUTION INTRAVENOUS at 07:56

## 2023-08-03 RX ADMIN — Medication 100 MCG: at 08:32

## 2023-08-03 RX ADMIN — OXYCODONE HYDROCHLORIDE 10 MG: 10 TABLET ORAL at 23:07

## 2023-08-03 RX ADMIN — GLYCOPYRROLATE 0.3 MG: 0.4 INJECTION INTRAMUSCULAR; INTRAVENOUS at 07:54

## 2023-08-03 RX ADMIN — SODIUM CHLORIDE, POTASSIUM CHLORIDE, SODIUM LACTATE AND CALCIUM CHLORIDE 1000 ML/HR: 600; 310; 30; 20 INJECTION, SOLUTION INTRAVENOUS at 07:17

## 2023-08-03 RX ADMIN — MIDAZOLAM 1 MG: 1 INJECTION INTRAMUSCULAR; INTRAVENOUS at 07:41

## 2023-08-03 RX ADMIN — ACETAMINOPHEN 1000 MG: 500 TABLET ORAL at 06:42

## 2023-08-03 RX ADMIN — ACETAMINOPHEN 1000 MG: 500 TABLET ORAL at 18:28

## 2023-08-03 RX ADMIN — Medication 50 MCG: at 08:04

## 2023-08-03 RX ADMIN — OXYTOCIN 5 UNITS: 10 INJECTION INTRAVENOUS at 08:10

## 2023-08-03 RX ADMIN — KETOROLAC TROMETHAMINE 15 MG: 15 INJECTION, SOLUTION INTRAMUSCULAR; INTRAVENOUS at 16:36

## 2023-08-03 RX ADMIN — ACETAMINOPHEN 1000 MG: 500 TABLET ORAL at 13:21

## 2023-08-03 RX ADMIN — Medication 500 ML: at 08:11

## 2023-08-03 RX ADMIN — Medication 500 ML: at 08:41

## 2023-08-03 RX ADMIN — SODIUM CHLORIDE, POTASSIUM CHLORIDE, SODIUM LACTATE AND CALCIUM CHLORIDE 1000 ML: 600; 310; 30; 20 INJECTION, SOLUTION INTRAVENOUS at 06:46

## 2023-08-03 RX ADMIN — SODIUM CITRATE AND CITRIC ACID MONOHYDRATE 30 ML: 500; 334 SOLUTION ORAL at 07:34

## 2023-08-03 RX ADMIN — DIPHENHYDRAMINE HYDROCHLORIDE 25 MG: 25 CAPSULE ORAL at 21:23

## 2023-08-03 RX ADMIN — BUPIVACAINE HYDROCHLORIDE IN DEXTROSE 1.4 ML: 7.5 INJECTION, SOLUTION SUBARACHNOID at 07:46

## 2023-08-03 RX ADMIN — FENTANYL CITRATE 20 MCG: 50 INJECTION, SOLUTION INTRAMUSCULAR; INTRAVENOUS at 07:46

## 2023-08-03 RX ADMIN — KETOROLAC TROMETHAMINE 15 MG: 30 INJECTION, SOLUTION INTRAMUSCULAR; INTRAVENOUS at 23:06

## 2023-08-03 RX ADMIN — DIPHENHYDRAMINE HYDROCHLORIDE 25 MG: 50 INJECTION INTRAMUSCULAR; INTRAVENOUS at 16:40

## 2023-08-03 RX ADMIN — FAMOTIDINE 20 MG: 10 INJECTION, SOLUTION INTRAVENOUS at 07:41

## 2023-08-03 RX ADMIN — CEFAZOLIN SODIUM 2 G: 2 INJECTION, SOLUTION INTRAVENOUS at 07:19

## 2023-08-03 RX ADMIN — ONDANSETRON 4 MG: 2 INJECTION INTRAMUSCULAR; INTRAVENOUS at 07:41

## 2023-08-03 RX ADMIN — KETOROLAC TROMETHAMINE 30 MG: 30 INJECTION, SOLUTION INTRAMUSCULAR; INTRAVENOUS at 09:40

## 2023-08-03 RX ADMIN — SIMETHICONE 80 MG: 80 TABLET, CHEWABLE ORAL at 20:15

## 2023-08-03 RX ADMIN — Medication 50 MCG: at 08:23

## 2023-08-03 RX ADMIN — DIPHENHYDRAMINE HYDROCHLORIDE 25 MG: 50 INJECTION INTRAMUSCULAR; INTRAVENOUS at 12:30

## 2023-08-03 RX ADMIN — DOCUSATE SODIUM 100 MG: 100 CAPSULE, LIQUID FILLED ORAL at 20:15

## 2023-08-03 RX ADMIN — MORPHINE SULFATE 0.1 MG: 0.5 INJECTION, SOLUTION EPIDURAL; INTRATHECAL; INTRAVENOUS at 07:46

## 2023-08-03 NOTE — H&P
History and Physical  [facility]    No chief complaint on file.      Julia Blunt is a 28 y.o. year old  with an Estimated Date of Delivery: 8/10/23 currently at 39w0d presenting with normal fetal movement, no contractions, no leaking, and no vaginal bleeding.    Prenatal care has been with Dr. Duy Schmidt MD.  It has been significant for previous C/S - (desires repeat ).  The patient does not desire Bilateral Tubal Ligation.     Pt had c/o itching and bile acids were normal.  LGA on last u/s vital signs, allergies, current medications, past medical history, past social history, past surgical history, and problem list    The following portions of the patient's history were reviewed and updated as appropriate:vital signs, allergies, current medications, past medical history, past social history, past surgical history, and problem list.    Review of Systems  Pertinent items are noted in HPI.     Objective     /70   Pulse 68   Temp 97.8 øF (36.6 øC) (Oral)   Resp 16   LMP 2022     Physical Exam    General:  well developed; well nourished  no acute distress           Abdomen: soft, non-tender; no masses  fundus firm and non-tender       FHT's: reactive and category 1   Cervix: Not examined   [place]: Contraction are irregular     Lab Review   Labs: CBC   Lab Results (last 24 hours)       ** No results found for the last 24 hours. **            Lab Results   Component Value Date    HGB 9.9 (L) 2023    HEPBSAG Negative 2023    ABO O 2023    RH Positive 2023    ABSCRN Negative 2023    [id number] Nonreactive 2023         ASSESSMENT  IUP at 39w0d  Problem List Items Addressed This Visit    None  Visit Diagnoses       Previous  section        Relevant Medications    lactated ringers bolus 1,000 mL (Completed)    lactated ringers infusion    Sod Citrate-Citric Acid (BICITRA) solution 30 mL    acetaminophen (TYLENOL) tablet  1,000 mg (Completed)    ceFAZolin in dextrose (ANCEF) IVPB solution 2 g         H/o prior C section x 2.   H/o Gestational HTN prior pregnancies.     PLAN  Repeat LTCS         Duy Schmidt MD  8/3/604213:26 EDT

## 2023-08-03 NOTE — OP NOTE
Section Procedure Note    Indications: H/o previous  section low transverse      Pre-operative Diagnosis: 1: 39w0d.  2: History of prior  section x2.  3: LGA fetus on ultrasound.                Pregnancy    Iron deficiency anemia secondary to inadequate dietary iron intake    History of  section complicating pregnancy    Uterine size date discrepancy pregnancy                Post-operative Diagnosis: 1:  Same.  2:  Delivery of viable female infant. 3: peritoneal adhesions. .    Procedures:  Procedure(s):   SECTION REPEAT; low transverse.     Surgeons:  Surgeon(s) and Role:     * Duy Schmidt MD - Primary    Anesthesia:  Spinal    Estimated Blood Loss:   595 ml    Infant:            Gender: female  infant    Weight: 3950 g (8 lb 11.3 oz)     Apgars: 8  @ 1 minute /     9  @ 5 minutes    Cord gases: Venous:  No results found for: PHCVEN   Arterial:  No results found for: PHCART               Findings:       The infant was delivered from the Presentation/Position: Vertex ;        The amnionic fluid was Normal;Clear . Omental and lower uterine adhesions to anterior peritoneum. Uterus tubes and ovaries wnl.  The pelvic inlet was noted to be narrow.    Drains:  Diane catheter to straight drainage.                 Specimens: * No order type specified *           Antibiotics:  cefazolin (Ancef)           Complications:  None; patient tolerated the procedure well.           Disposition: PACU - hemodynamically stable.           Condition: stable    Procedure Details   The patient was seen in the Holding Room. The risks, benefits, complications, treatment options, and expected outcomes were discussed with the patient.  The patient concurred with the proposed plan, giving informed consent.  The patient was taken to the Operating Room, identified as Julia Traorejandrnajma Blunt and the procedure verified as  Delivery. A Time Out was held and the above information  confirmed.    After an adequate level of anesthesia was obtained, the patient was draped and prepped in the usual sterile manner. A Pfannenstiel incision was made and carried down through the subcutaneous tissue to the fascia. Fascial incision was made and extended transversely with the Oconnell scissors. The fascia was  bluntly and sharply from the underlying rectus tissue superiorly and inferiorly. The rectus muscles were divided sharply along the midline. The peritoneum was noted to be adherent to the fascia along the midline and the peritoneal cavity was the identified and entered sharply.. Peritoneal incision was extended longitudinally taking care not to injure the bladder and bowel.  Omental adhesions to the peritoneal wall were lysed and lower uterine segment peritoneal adhesions were lysed sharply.  The bladder flap was sharply and bluntly developed  A low transverse uterine incision was made with the scalpel.  The uterine cavity was entered sharply and extended bluntly. The infant was delivered from  Vertex  presentation without difficulty.  A nuchal cord x1 was reduced.  The nose and oropharynx were bulb suctioned.  After the umbilical cord was milked four times, it was clamped and cut, and cord blood was obtained for evaluation. The placenta was expressed intact and appeared normal. The uterus was exteriorized and wrapped in a moist laparotomy sponge. The endometrial cavity was explored and wiped clean with a moist laparotomy sponge.  The uterine outline, tubes and ovaries appeared normal. The uterine incision was closed in a single layer with a running continuous locking suture of 0 Monocryl. Hemostasis was obtained. The uterus was returned to the peritoneal cavity.  Omental adhesions were lysed with electrocautery.  Irrigation was performed and hemostasis confirmed. All sponge and instrument counts were correct. The peritoneum was closed with a running continuous suture of 2-0 Vicryl;  The rectus  muscles reapproximated with interrupted sutures of 0 Vicryl. The fascia was then reapproximated with running continuous sutures of 0 Vicryl. The subcutaneous layer was irrigated, noted to be hemostatic, and closed with 3-0 plain Gut.  The skin was reapproximated with Insorb subcuticular Staples and skin Glue .    Instrument, sponge, and needle counts were correct prior the abdominal closure and at the conclusion of the case. Minimal clots expressed. The patient went to the Recovery Room in stable condition with the byers draining clear urine.       Duy Schmidt MD      8/3/2023  09:01 EDT

## 2023-08-03 NOTE — SIGNIFICANT NOTE
08/03/23 1245   Maternal Information   Date of Referral 08/03/23   Person Making Referral lactation consultant  (new mother/baby couplet)   Maternal Reason for Referral other (see comments)  (Mom said she is planning to both breast and bottle feed formula.)   Maternal Infant Feeding   Maternal Emotional State relaxed;receptive;independent   Infant Positioning other (see comments)  (Mom has given formula but has not put the baby to the breast yet.)   Milk Expression/Equipment   Breast Pump Type double electric, personal;manual pump  (Mom has a double electric pump at home that was provided through her medical insurance.  She was given a manual pump to use in the hospital.)     Mom said she wants to both breast and bottle feed.  She said this is what she did with her first 2 children.  She has a new home breast pump, but left it at home.  She was provided a manual pump to use in the hospital along with oral feeding syringes.  She was encouraged to put the baby to the breast as much as possible (every 3 hours) and to use the manual pump anytime baby doesn't nurse. She was also encouraged to call for latch assistance, if needed.

## 2023-08-04 PROBLEM — Z12.4 SCREENING FOR CERVICAL CANCER: Status: RESOLVED | Noted: 2023-04-26 | Resolved: 2023-08-04

## 2023-08-04 PROBLEM — O34.219 HISTORY OF CESAREAN SECTION COMPLICATING PREGNANCY: Status: RESOLVED | Noted: 2023-04-26 | Resolved: 2023-08-04

## 2023-08-04 PROBLEM — O26.849 UTERINE SIZE DATE DISCREPANCY PREGNANCY: Status: RESOLVED | Noted: 2023-07-14 | Resolved: 2023-08-04

## 2023-08-04 PROBLEM — Z87.59 HISTORY OF GESTATIONAL HYPERTENSION: Status: RESOLVED | Noted: 2023-04-26 | Resolved: 2023-08-04

## 2023-08-04 PROBLEM — E87.6 HYPOKALEMIA DUE TO EXCESSIVE GASTROINTESTINAL LOSS OF POTASSIUM: Status: RESOLVED | Noted: 2023-06-20 | Resolved: 2023-08-04

## 2023-08-04 PROBLEM — Z34.90 PREGNANCY: Status: RESOLVED | Noted: 2023-04-26 | Resolved: 2023-08-04

## 2023-08-04 PROBLEM — E87.6 HYPOKALEMIA: Status: RESOLVED | Noted: 2023-06-19 | Resolved: 2023-08-04

## 2023-08-04 LAB
ALP SERPL-CCNC: 195 U/L (ref 39–117)
ALT SERPL W P-5'-P-CCNC: 9 U/L (ref 1–33)
ANISOCYTOSIS BLD QL: NORMAL
AST SERPL-CCNC: 15 U/L (ref 1–32)
BASOPHILS # BLD AUTO: 0 10*3/MM3 (ref 0–0.2)
BASOPHILS NFR BLD AUTO: 0 % (ref 0–1.5)
BILIRUB SERPL-MCNC: 0.2 MG/DL (ref 0–1.2)
CREAT SERPL-MCNC: 0.49 MG/DL (ref 0.57–1)
DEPRECATED RDW RBC AUTO: 65.3 FL (ref 37–54)
EOSINOPHIL # BLD AUTO: 0.1 10*3/MM3 (ref 0–0.4)
EOSINOPHIL NFR BLD AUTO: 1.3 % (ref 0.3–6.2)
ERYTHROCYTE [DISTWIDTH] IN BLOOD BY AUTOMATED COUNT: 24.2 % (ref 12.3–15.4)
HCT VFR BLD AUTO: 28.4 % (ref 34–46.6)
HGB BLD-MCNC: 8.5 G/DL (ref 12–15.9)
IMM GRANULOCYTES # BLD AUTO: 0.05 10*3/MM3 (ref 0–0.05)
IMM GRANULOCYTES NFR BLD AUTO: 0.7 % (ref 0–0.5)
LDH SERPL-CCNC: 279 U/L (ref 135–214)
LYMPHOCYTES # BLD AUTO: 1.88 10*3/MM3 (ref 0.7–3.1)
LYMPHOCYTES NFR BLD AUTO: 25.3 % (ref 19.6–45.3)
MCH RBC QN AUTO: 22.7 PG (ref 26.6–33)
MCHC RBC AUTO-ENTMCNC: 29.9 G/DL (ref 31.5–35.7)
MCV RBC AUTO: 75.7 FL (ref 79–97)
MONOCYTES # BLD AUTO: 0.31 10*3/MM3 (ref 0.1–0.9)
MONOCYTES NFR BLD AUTO: 4.2 % (ref 5–12)
NEUTROPHILS NFR BLD AUTO: 5.09 10*3/MM3 (ref 1.7–7)
NEUTROPHILS NFR BLD AUTO: 68.5 % (ref 42.7–76)
NRBC BLD AUTO-RTO: 0.3 /100 WBC (ref 0–0.2)
PLAT MORPH BLD: NORMAL
PLATELET # BLD AUTO: 183 10*3/MM3 (ref 140–450)
PMV BLD AUTO: 11.1 FL (ref 6–12)
RBC # BLD AUTO: 3.75 10*6/MM3 (ref 3.77–5.28)
URATE SERPL-MCNC: 4 MG/DL (ref 2.4–5.7)
WBC MORPH BLD: NORMAL
WBC NRBC COR # BLD: 7.43 10*3/MM3 (ref 3.4–10.8)

## 2023-08-04 PROCEDURE — 84075 ASSAY ALKALINE PHOSPHATASE: CPT | Performed by: OBSTETRICS & GYNECOLOGY

## 2023-08-04 PROCEDURE — 82247 BILIRUBIN TOTAL: CPT | Performed by: OBSTETRICS & GYNECOLOGY

## 2023-08-04 PROCEDURE — 84460 ALANINE AMINO (ALT) (SGPT): CPT | Performed by: OBSTETRICS & GYNECOLOGY

## 2023-08-04 PROCEDURE — 85007 BL SMEAR W/DIFF WBC COUNT: CPT | Performed by: OBSTETRICS & GYNECOLOGY

## 2023-08-04 PROCEDURE — 25010000002 KETOROLAC TROMETHAMINE PER 15 MG: Performed by: OBSTETRICS & GYNECOLOGY

## 2023-08-04 PROCEDURE — 82565 ASSAY OF CREATININE: CPT | Performed by: OBSTETRICS & GYNECOLOGY

## 2023-08-04 PROCEDURE — 83615 LACTATE (LD) (LDH) ENZYME: CPT | Performed by: OBSTETRICS & GYNECOLOGY

## 2023-08-04 PROCEDURE — 84450 TRANSFERASE (AST) (SGOT): CPT | Performed by: OBSTETRICS & GYNECOLOGY

## 2023-08-04 PROCEDURE — 84550 ASSAY OF BLOOD/URIC ACID: CPT | Performed by: OBSTETRICS & GYNECOLOGY

## 2023-08-04 PROCEDURE — 63710000001 DIPHENHYDRAMINE PER 50 MG: Performed by: NURSE ANESTHETIST, CERTIFIED REGISTERED

## 2023-08-04 PROCEDURE — 85025 COMPLETE CBC W/AUTO DIFF WBC: CPT | Performed by: OBSTETRICS & GYNECOLOGY

## 2023-08-04 RX ADMIN — OXYCODONE HYDROCHLORIDE 10 MG: 10 TABLET ORAL at 04:25

## 2023-08-04 RX ADMIN — SIMETHICONE 80 MG: 80 TABLET, CHEWABLE ORAL at 07:59

## 2023-08-04 RX ADMIN — ACETAMINOPHEN 650 MG: 325 TABLET ORAL at 19:55

## 2023-08-04 RX ADMIN — IBUPROFEN 600 MG: 600 TABLET, FILM COATED ORAL at 23:57

## 2023-08-04 RX ADMIN — ACETAMINOPHEN 1000 MG: 500 TABLET ORAL at 00:59

## 2023-08-04 RX ADMIN — IBUPROFEN 600 MG: 600 TABLET, FILM COATED ORAL at 16:51

## 2023-08-04 RX ADMIN — OXYCODONE HYDROCHLORIDE 10 MG: 10 TABLET ORAL at 16:51

## 2023-08-04 RX ADMIN — KETOROLAC TROMETHAMINE 15 MG: 30 INJECTION, SOLUTION INTRAMUSCULAR; INTRAVENOUS at 06:03

## 2023-08-04 RX ADMIN — OXYCODONE HYDROCHLORIDE 10 MG: 10 TABLET ORAL at 20:57

## 2023-08-04 RX ADMIN — ACETAMINOPHEN 1000 MG: 500 TABLET ORAL at 07:59

## 2023-08-04 RX ADMIN — ACETAMINOPHEN 650 MG: 325 TABLET ORAL at 13:44

## 2023-08-04 RX ADMIN — KETOROLAC TROMETHAMINE 15 MG: 30 INJECTION, SOLUTION INTRAMUSCULAR; INTRAVENOUS at 11:27

## 2023-08-04 RX ADMIN — PRENATAL VITAMINS-IRON FUMARATE 27 MG IRON-FOLIC ACID 0.8 MG TABLET 1 TABLET: at 07:59

## 2023-08-04 RX ADMIN — DOCUSATE SODIUM 100 MG: 100 CAPSULE, LIQUID FILLED ORAL at 07:59

## 2023-08-04 RX ADMIN — DIPHENHYDRAMINE HYDROCHLORIDE 25 MG: 25 CAPSULE ORAL at 06:09

## 2023-08-04 NOTE — PLAN OF CARE
Goal Outcome Evaluation:  Plan of Care Reviewed With: patient, spouse        Progress: improving  Outcome Evaluation: VSS, pain well controlled, FU/1, LTI CHANDANA CDI

## 2023-08-05 VITALS
DIASTOLIC BLOOD PRESSURE: 80 MMHG | SYSTOLIC BLOOD PRESSURE: 114 MMHG | TEMPERATURE: 97.6 F | OXYGEN SATURATION: 99 % | HEART RATE: 87 BPM | RESPIRATION RATE: 16 BRPM

## 2023-08-05 LAB
BASOPHILS # BLD AUTO: 0.01 10*3/MM3 (ref 0–0.2)
BASOPHILS NFR BLD AUTO: 0.2 % (ref 0–1.5)
DEPRECATED RDW RBC AUTO: 66.7 FL (ref 37–54)
EOSINOPHIL # BLD AUTO: 0.17 10*3/MM3 (ref 0–0.4)
EOSINOPHIL NFR BLD AUTO: 2.7 % (ref 0.3–6.2)
ERYTHROCYTE [DISTWIDTH] IN BLOOD BY AUTOMATED COUNT: 24.4 % (ref 12.3–15.4)
HCT VFR BLD AUTO: 29.3 % (ref 34–46.6)
HGB BLD-MCNC: 8.5 G/DL (ref 12–15.9)
IMM GRANULOCYTES # BLD AUTO: 0.07 10*3/MM3 (ref 0–0.05)
IMM GRANULOCYTES NFR BLD AUTO: 1.1 % (ref 0–0.5)
LYMPHOCYTES # BLD AUTO: 2.35 10*3/MM3 (ref 0.7–3.1)
LYMPHOCYTES NFR BLD AUTO: 36.7 % (ref 19.6–45.3)
MCH RBC QN AUTO: 22.1 PG (ref 26.6–33)
MCHC RBC AUTO-ENTMCNC: 29 G/DL (ref 31.5–35.7)
MCV RBC AUTO: 76.1 FL (ref 79–97)
MONOCYTES # BLD AUTO: 0.36 10*3/MM3 (ref 0.1–0.9)
MONOCYTES NFR BLD AUTO: 5.6 % (ref 5–12)
NEUTROPHILS NFR BLD AUTO: 3.45 10*3/MM3 (ref 1.7–7)
NEUTROPHILS NFR BLD AUTO: 53.7 % (ref 42.7–76)
NRBC BLD AUTO-RTO: 0 /100 WBC (ref 0–0.2)
PLATELET # BLD AUTO: 191 10*3/MM3 (ref 140–450)
PMV BLD AUTO: 10.8 FL (ref 6–12)
RBC # BLD AUTO: 3.85 10*6/MM3 (ref 3.77–5.28)
WBC NRBC COR # BLD: 6.41 10*3/MM3 (ref 3.4–10.8)

## 2023-08-05 PROCEDURE — 85025 COMPLETE CBC W/AUTO DIFF WBC: CPT | Performed by: NURSE PRACTITIONER

## 2023-08-05 PROCEDURE — 0503F POSTPARTUM CARE VISIT: CPT | Performed by: OBSTETRICS & GYNECOLOGY

## 2023-08-05 RX ORDER — OXYCODONE HYDROCHLORIDE 5 MG/1
5 TABLET ORAL EVERY 4 HOURS PRN
Qty: 18 TABLET | Refills: 0 | Status: SHIPPED | OUTPATIENT
Start: 2023-08-05 | End: 2023-08-08

## 2023-08-05 RX ORDER — IBUPROFEN 600 MG/1
600 TABLET ORAL EVERY 6 HOURS
Qty: 30 TABLET | Refills: 0 | Status: SHIPPED | OUTPATIENT
Start: 2023-08-05 | End: 2023-08-18

## 2023-08-05 RX ORDER — FERROUS SULFATE 325(65) MG
325 TABLET ORAL 2 TIMES DAILY
Qty: 60 TABLET | Refills: 2 | Status: SHIPPED | OUTPATIENT
Start: 2023-08-05

## 2023-08-05 RX ORDER — VITAMIN C, CALCIUM, IRON, VITAMIN D3, VITAMIN E, THIAMIN, RIBOFLAVIN, NIACINAMIDE, VITAMIN B6, FOLIC ACID, IODINE, ZINC, COPPER, DOCUSATE SODIUM
1 KIT DAILY
Qty: 60 TABLET | Refills: 12 | Status: SHIPPED | OUTPATIENT
Start: 2023-08-05 | End: 2024-08-04

## 2023-08-05 RX ORDER — CYCLOBENZAPRINE HCL 10 MG
10 TABLET ORAL EVERY 8 HOURS PRN
Qty: 15 TABLET | Refills: 0 | Status: SHIPPED | OUTPATIENT
Start: 2023-08-05 | End: 2023-08-18

## 2023-08-05 RX ADMIN — OXYCODONE HYDROCHLORIDE 10 MG: 10 TABLET ORAL at 09:33

## 2023-08-05 RX ADMIN — OXYCODONE HYDROCHLORIDE 10 MG: 10 TABLET ORAL at 13:16

## 2023-08-05 RX ADMIN — OXYCODONE HYDROCHLORIDE 10 MG: 10 TABLET ORAL at 05:24

## 2023-08-05 RX ADMIN — ACETAMINOPHEN 650 MG: 325 TABLET ORAL at 02:25

## 2023-08-05 RX ADMIN — ACETAMINOPHEN 650 MG: 325 TABLET ORAL at 07:56

## 2023-08-05 RX ADMIN — IBUPROFEN 600 MG: 600 TABLET, FILM COATED ORAL at 11:03

## 2023-08-05 RX ADMIN — OXYCODONE HYDROCHLORIDE 10 MG: 10 TABLET ORAL at 01:16

## 2023-08-05 RX ADMIN — IBUPROFEN 600 MG: 600 TABLET, FILM COATED ORAL at 05:24

## 2023-08-05 RX ADMIN — PRENATAL VITAMINS-IRON FUMARATE 27 MG IRON-FOLIC ACID 0.8 MG TABLET 1 TABLET: at 07:55

## 2023-08-05 RX ADMIN — ACETAMINOPHEN 650 MG: 325 TABLET ORAL at 13:16

## 2023-08-05 RX ADMIN — DOCUSATE SODIUM 100 MG: 100 CAPSULE, LIQUID FILLED ORAL at 07:55

## 2023-08-05 NOTE — DISCHARGE SUMMARY
Discharge Summary    Date of Admission: 8/3/2023  Date of Discharge:  2023      Patient: Julia Blunt      MR#:[id number]    Delivery Provider: Duy Schmidt       Presenting Problem/History of Present Illness  Pregnancy [Z34.90]   delivery delivered [O82]       Postpartum care following  delivery - 8/3/23 (girl)    Iron deficiency anemia secondary to inadequate dietary iron intake         Discharge Diagnosis:  repeat csection at 39w0d    Procedures:  , Low Transverse     8/3/2023    8:08 AM          Hospital Course  Patient is a 28 y.o. female  at 39w0d status post repeat csection without complication. Postpartum the patient did well. She remained afebrile, with vital signs stable. She requested discharge on postpartum day 2.  H&H is stable.  She will continue iron for 12wks    Infant:   female  fetus 3950 g (8 lb 11.3 oz)  with Apgar scores of 8 , 9  at five minutes.    Condition on Discharge:  Stable    Vital Signs       Lab Results   Component Value Date    WBC 8.00 2023    WBC 8.00 2023    HGB 10.5 (L) 2023    HGB 10.5 (L) 2023    HCT 36.4 2023    HCT 36.4 2023    MCV 79.6 2023    MCV 79.6 2023     2023     2023       Discharge Disposition  Home or Self Care    Discharge Medications     Discharge Medications        New Medications        Instructions Start Date   cyclobenzaprine 10 MG tablet  Commonly known as: FLEXERIL   10 mg, Oral, Every 8 Hours PRN      ibuprofen 600 MG tablet  Commonly known as: ADVIL,MOTRIN   600 mg, Oral, Every 6 Hours             Continue These Medications        Instructions Start Date   CitraNatal DHA 27-1 & 250 MG tablet   1 tablet, Oral, Daily      ferrous sulfate 325 (65 FE) MG tablet   325 mg, Oral, 2 Times Daily             Stop These Medications      acetaminophen 650 MG 8 hr tablet  Commonly known as: TYLENOL     hydrocortisone 2.5 % cream      ondansetron 4 MG tablet  Commonly known as: ZOFRAN            ASK your doctor about these medications        Instructions Start Date   oxyCODONE 5 MG immediate release tablet  Commonly known as: ROXICODONE  Ask about: Should I take this medication?   5 mg, Oral, Every 4 Hours PRN               Follow-up Appointments  Future Appointments   Date Time Provider Department Center   8/18/2023 12:45 PM Margareth Dickens, [facility]     Additional Instructions for the Follow-ups that You Need to Schedule       Discharge Follow-up with Specified Provider: adama/np; 1 Week   As directed      To: adama/jeannie   Follow Up: 1 Week   Follow Up Details: bp check                Duy Schmidt MD  08/14/23  10:34 EDT  [facility]

## 2023-08-11 ENCOUNTER — POSTPARTUM VISIT (OUTPATIENT)
Dept: OBSTETRICS AND GYNECOLOGY | Facility: CLINIC | Age: 28
End: 2023-08-11
Payer: COMMERCIAL

## 2023-08-11 VITALS — SYSTOLIC BLOOD PRESSURE: 130 MMHG | WEIGHT: 124.2 LBS | BODY MASS INDEX: 25.09 KG/M2 | DIASTOLIC BLOOD PRESSURE: 80 MMHG

## 2023-08-11 DIAGNOSIS — R51.9 ACUTE NONINTRACTABLE HEADACHE, UNSPECIFIED HEADACHE TYPE: Primary | ICD-10-CM

## 2023-08-11 DIAGNOSIS — H53.9 VISION CHANGES: ICD-10-CM

## 2023-08-11 NOTE — PROGRESS NOTES
Chief Complaint   Patient presents with    Postpartum Care     BP check       Postpartum blood pressure check visit         Julia Blunt is a 28 y.o.  who presents today for a blood pressure check.    , Low Transverse    Information for the patient's :  Maya Ovalles [[id number]]   8/3/2023   female   Maya Ovalles   3950 g (8 lb 11.3 oz)   Gestational Age: 39w0d    Baby Discharged: Discharged with Mom  Delivering Physician: Jamarcus Karimi MD    The patient is 1 week PP. She was diagnosed with GHTN. The patient did not go home on blood pressure medication and did not received magnesium. She does not check BP at home. The patient complains of headaches (sometimes Tylenol resolves) and occasionally vision change. Sometimes see spots.     Patient describes her vaginal bleeding as light.  Patient is bottle feeding.  Desires contraceptive methods: Abstinence for contraception. She denies bowel or bladder issues.    Patient denies postpartum depression. Postpartum Depression Screening Questionnaire: 3, no treatment indicated.    The additional following portions of the patient's history were reviewed and updated as appropriate: allergies and current medications.      Review of Systems   Eyes:  Positive for visual disturbance.   Respiratory: Negative.  Negative for shortness of breath.    Cardiovascular: Negative.  Negative for chest pain.   Gastrointestinal: Negative.  Negative for abdominal distention, abdominal pain and constipation.   Genitourinary:  Positive for vaginal bleeding. Negative for breast discharge, breast lump, breast pain, dysuria, pelvic pain, pelvic pressure, urinary incontinence, vaginal discharge and vaginal pain.        Incision tenderness   Neurological:  Positive for headache. Negative for light-headedness.   Psychiatric/Behavioral: Negative.  Negative for depressed mood. The patient is not nervous/anxious.    All other systems reviewed and are  negative.     I have reviewed and agree with the HPI, ROS, and historical information as entered above. Margareth ANTOINE Maninder, APRN      Objective   /80   Wt 56.3 kg (124 lb 3.2 oz)   LMP 2022   Breastfeeding No   BMI 25.09 kg/mý     Physical Exam  Vitals and nursing note reviewed.   Constitutional:       General: She is not in acute distress.     Appearance: Normal appearance. She is not ill-appearing or toxic-appearing.   HENT:      Head: Normocephalic and atraumatic.   Pulmonary:      Effort: Pulmonary effort is normal.   Abdominal:      Palpations: There is no mass.      Tenderness: There is abdominal tenderness (nml post op incision tenderness).      Hernia: No hernia is present.      Comments: LTCS incision well approximated; no drainage, bleeding, or redness.     Neurological:      Mental Status: She is alert and oriented to person, place, and time.   Psychiatric:         Mood and Affect: Mood normal.         Behavior: Behavior normal.        Assessment and Plan    Problem List Items Addressed This Visit    None  Visit Diagnoses       Acute nonintractable headache, unspecified headache type    -  Primary    Relevant Orders    CBC (No Diff)    AlP+ALT+AST+Creat+LD+TBili+.. (Completed)    Vision changes        Relevant Orders    CBC (No Diff)    AlP+ALT+AST+Creat+LD+TBili+.. (Completed)            S/p , 1 week PP. Doing well.  Continue monitoring BP at home. Call if >140/90  We reviewed preeclampsia precautions. Labs ordered.    Plan:  Continued pelvic rest.   Tylenol and rest for improvement of HA and vision changes. Need to f/u with Optometrist if vision changes continues and labs other symptoms stable.  Will notify pt w/ lab results.    RTC for worsening sx or if BP >140/90.  Return in about 1 week (around 2023) for 2 week PP f/u incision .    Margareth ELINA Dickens, APRN  2023

## 2023-08-12 LAB
ALP SERPL-CCNC: 157 U/L (ref 39–117)
ALT SERPL-CCNC: 14 U/L (ref 1–33)
AST SERPL-CCNC: 19 U/L (ref 1–32)
BASOPHILS # BLD AUTO: ABNORMAL 10*3/UL
BILIRUB SERPL-MCNC: 0.2 MG/DL (ref 0–1.2)
CREAT SERPL-MCNC: 0.56 MG/DL (ref 0.57–1)
EOSINOPHIL # BLD AUTO: ABNORMAL 10*3/UL
EOSINOPHIL NFR BLD AUTO: ABNORMAL %
ERYTHROCYTE [DISTWIDTH] IN BLOOD BY AUTOMATED COUNT: 25.1 % (ref 12.3–15.4)
HCT VFR BLD AUTO: 36.4 % (ref 34–46.6)
HGB BLD-MCNC: 10.5 G/DL (ref 12–15.9)
LDH SERPL L TO P-CCNC: 314 U/L (ref 135–214)
LYMPHOCYTES # BLD AUTO: ABNORMAL 10*3/UL
LYMPHOCYTES NFR BLD AUTO: ABNORMAL %
MCH RBC QN AUTO: 23 PG (ref 26.6–33)
MCHC RBC AUTO-ENTMCNC: 28.8 G/DL (ref 31.5–35.7)
MCV RBC AUTO: 79.6 FL (ref 79–97)
MONOCYTES NFR BLD AUTO: ABNORMAL %
NEUTROPHILS NFR BLD AUTO: ABNORMAL %
PLATELET # BLD AUTO: 293 10E3/MM3 (ref 140–450)
RBC # BLD AUTO: 4.57 10E6/MM3 (ref 3.77–5.28)
URATE SERPL-MCNC: 5.4 MG/DL (ref 2.4–5.7)
WBC # BLD AUTO: 8 10E3/MM3 (ref 3.4–10.8)

## 2023-08-18 ENCOUNTER — POSTPARTUM VISIT (OUTPATIENT)
Dept: OBSTETRICS AND GYNECOLOGY | Facility: CLINIC | Age: 28
End: 2023-08-18
Payer: COMMERCIAL

## 2023-08-18 VITALS
SYSTOLIC BLOOD PRESSURE: 114 MMHG | DIASTOLIC BLOOD PRESSURE: 72 MMHG | BODY MASS INDEX: 22.13 KG/M2 | HEIGHT: 61 IN | WEIGHT: 117.2 LBS

## 2023-08-18 PROCEDURE — 0503F POSTPARTUM CARE VISIT: CPT

## 2023-08-18 NOTE — PROGRESS NOTES
"      Chief Complaint   Patient presents with    Postpartum Care     2 weeks        Postpartum Visit         Julia Blunt is a 28 y.o.  who presents today for a 2 week(s) postpartum check.        , Low Transverse    Information for the patient's :  Maya Ovalles [[id number]]   8/3/2023   female   Maya Ovalles   3950 g (8 lb 11.3 oz)   Gestational Age: 39w0d    Baby Discharged with Mom  Delivering MD: Duy Schmidt MD.    Pregnancy complications:  Mild anemia & Hx  GHTN    Patient reports her incision is clean, dry, intact. Patient describes vaginal bleeding as light.  She is breast and bottle feeding. Patient denies bowel or bladder issues. She denies preeclampsia symptoms.     She desires contraceptive methods: progesterone only pill  for contraception.    Patient denies postpartum depression. Postpartum Depression Screening Questionnaire: 3, no treatment indicated.      The additional following portions of the patient's history were reviewed and updated as appropriate: allergies and current medications.      Review of Systems   Respiratory: Negative.  Negative for shortness of breath.    Cardiovascular: Negative.  Negative for chest pain.   Gastrointestinal: Negative.  Negative for abdominal distention, abdominal pain and constipation.   Genitourinary:  Negative for breast discharge, breast lump, breast pain, dyspareunia, dysuria, menstrual problem, pelvic pain, pelvic pressure, urinary incontinence, vaginal bleeding, vaginal discharge and vaginal pain.   Psychiatric/Behavioral: Negative.  Negative for depressed mood. The patient is not nervous/anxious.      I have reviewed and agree with the HPI, ROS, and historical information as entered above. Margareth Dickens, APRN      Objective   /72   Ht 154.9 cm (61\")   Wt 53.2 kg (117 lb 3.2 oz)   LMP 2022   Breastfeeding Yes   BMI 22.14 kg/mý     Physical Exam  Vitals and nursing note reviewed. "   Constitutional:       General: She is not in acute distress.     Appearance: Normal appearance. She is not ill-appearing or toxic-appearing.   HENT:      Head: Normocephalic and atraumatic.   Pulmonary:      Effort: Pulmonary effort is normal.   Abdominal:      Palpations: Abdomen is soft. There is no mass.      Tenderness: There is abdominal tenderness (nml postop tenderness).      Hernia: No hernia is present.      Comments: LTCS incision well approximated; no drainage, bleeding, or redness.       Neurological:      Mental Status: She is alert and oriented to person, place, and time.   Psychiatric:         Mood and Affect: Mood normal.         Behavior: Behavior normal.         Thought Content: Thought content normal.         Judgment: Judgment normal.          Assessment and Plan    Problem List Items Addressed This Visit          Gravid and     Postpartum care following  delivery - 8/3/23 (girl) - Primary       S/p , 2 week(s) postpartum.  Doing well.    Baby doing well.  Breastfeeding going well.  No si/sx of postpartum depression  Contraception: contraceptive methods: Abstinence    Plan:   Continued pelvic rest with a return to driving and light physical activity.  Contraception: Desires POP @ 6 weeks PP.  Return in about 4 weeks (around 9/15/2023) for 6 week PP visit, Brayan.    Margareth Dickens, APRN  2023

## 2023-09-15 ENCOUNTER — TELEPHONE (OUTPATIENT)
Dept: OBSTETRICS AND GYNECOLOGY | Facility: CLINIC | Age: 28
End: 2023-09-15

## 2023-09-15 ENCOUNTER — POSTPARTUM VISIT (OUTPATIENT)
Dept: OBSTETRICS AND GYNECOLOGY | Facility: CLINIC | Age: 28
End: 2023-09-15
Payer: COMMERCIAL

## 2023-09-15 VITALS
WEIGHT: 122 LBS | SYSTOLIC BLOOD PRESSURE: 112 MMHG | DIASTOLIC BLOOD PRESSURE: 68 MMHG | BODY MASS INDEX: 23.03 KG/M2 | HEIGHT: 61 IN

## 2023-09-15 DIAGNOSIS — Z01.419 PAP TEST, AS PART OF ROUTINE GYNECOLOGICAL EXAMINATION: Primary | ICD-10-CM

## 2023-09-15 DIAGNOSIS — Z20.2 POSSIBLE EXPOSURE TO STD: ICD-10-CM

## 2023-09-15 DIAGNOSIS — Z12.4 SCREENING FOR CERVICAL CANCER: ICD-10-CM

## 2023-09-15 DIAGNOSIS — N89.8 VAGINAL DISCHARGE: ICD-10-CM

## 2023-09-15 PROBLEM — Z30.09 BIRTH CONTROL COUNSELING: Status: ACTIVE | Noted: 2023-09-15

## 2023-09-15 RX ORDER — NORELGESTROMIN AND ETHINYL ESTRADIOL 150; 35 UG/D; UG/D
1 PATCH TRANSDERMAL WEEKLY
Qty: 4 PATCH | Refills: 11 | Status: SHIPPED | OUTPATIENT
Start: 2023-09-15 | End: 2024-09-14

## 2023-09-15 NOTE — TELEPHONE ENCOUNTER
Spoke to pharmacist regarding patients Xulane patches. She needed clarification on whether or not patient is going to use patch continuously or take one week off per month for menses. Advised pharmacist to fill prescription for #3 with 11 refills.

## 2023-09-15 NOTE — PROGRESS NOTES
Chief Complaint   Patient presents with    Postpartum Care       Postpartum Visit         Julia Blunt is a 28 y.o.  who presents today for a 6 week(s) postpartum check.     C/S: repeat     , Low Transverse    Information for the patient's :  Maya Hidalgo [[id number]]   8/3/2023   female   Maya Blunt   3950 g (8 lb 11.3 oz)   Gestational Age: 39w0d        Baby Discharged: Discharged with Mom  Delivering Physician: Duy Schmidt MD    At the time of delivery were you diagnosed with any of the following: None. The incision is healing well. Patient describes vaginal bleeding as absent.  Patient is bottle feeding.  She desires contraceptive methods: birth control patch  for contraception.  Patient denies bowel or bladder issues.      Patient denies postpartum depression. Postpartum Depression Screening Questionnaire: 6, no treatment indicated.      Last Pap : unsure. Results: unknown . HPV: unknown .   Last Completed Pap Smear       This patient has no relevant Health Maintenance data.              The additional following portions of the patient's history were reviewed and updated as appropriate: allergies, current medications, past family history, past medical history, past social history, past surgical history, and problem list.    Review of Systems   Constitutional: Negative.  Negative for chills and fever.   HENT: Negative.  Negative for sore throat.    Eyes: Negative.    Respiratory: Negative.     Cardiovascular: Negative.    Gastrointestinal: Negative.  Negative for abdominal pain, constipation, diarrhea, nausea and vomiting.   Endocrine: Negative.    Genitourinary: Negative.  Negative for dysuria, flank pain, hematuria and urgency.   Musculoskeletal: Negative.  Negative for back pain.   Skin: Negative.  Negative for rash.   Allergic/Immunologic: Negative.    Neurological: Negative.    Hematological: Negative.   "  Psychiatric/Behavioral: Negative.     All other systems reviewed and are negative.     I have reviewed and agree with the HPI, ROS, and historical information as entered above.   Duy Schmidt MD      /68 (BP Location: Right arm, Patient Position: Sitting, Cuff Size: Adult)   Ht 154.9 cm (61\")   Wt 55.3 kg (122 lb)   LMP 2022   Breastfeeding No   BMI 23.05 kg/m²     Physical Exam  Vitals and nursing note reviewed. Exam conducted with a chaperone present.   Constitutional:       Appearance: Normal appearance. She is well-developed and normal weight.   HENT:      Head: Normocephalic and atraumatic.   Pulmonary:      Effort: Pulmonary effort is normal.   Abdominal:      General: A surgical scar is present. There is no distension.      Palpations: Abdomen is soft. Abdomen is not rigid. There is no mass.      Tenderness: There is no abdominal tenderness.      Comments: Clean, Dry, and Intact.  No erythema.    Genitourinary:     General: Normal vulva.      Exam position: Lithotomy position.      Vagina: Vaginal discharge present. No bleeding, lesions or prolapsed vaginal walls.      Cervix: No lesion, erythema or cervical bleeding.      Uterus: Not tender and no uterine prolapse.       Adnexa:         Right: No mass, tenderness or fullness.          Left: No mass, tenderness or fullness.        Comments: White vaginal discharge.  Musculoskeletal:      Cervical back: Normal range of motion.   Neurological:      Mental Status: She is alert and oriented to person, place, and time.   Psychiatric:         Mood and Affect: Mood normal.         Behavior: Behavior normal.           Assessment and Plan    Problem List Items Addressed This Visit              Screening for cervical cancer    Overview     Last Pap ?? in [place]. Unsure date.     9/15/2023 Pap smear with HPV screen done.         Postpartum care following  delivery - 8/3/23 (girl)     Other Visit Diagnoses       Pap test, as " part of routine gynecological examination    -  Primary    Relevant Orders    LIQUID-BASED PAP SMEAR WITH HPV GENOTYPING REGARDLESS OF INTERPRETATION ([facility])    Postpartum follow-up        Vaginal discharge                S/p , 6 week(s) postpartum.  Doing well.    Return to normal physical activity.  No pelvic restrictions.   Baby doing well.  Bottlefeeding going well.  No si/sx of postpartum depression  Breast-fed x3 weeks.  No menses yet.  Contraception: contraceptive methods: Requests contraceptive patch.  Rx for Xulane sent to pharmacy.  Vaginal discharge.  Testing for BV, trichomoniasis and yeast sent on Pap smear.  Pap smear with HPV screen done.  Return in about 1 year (around 9/15/2024) for Annual physical.     Duy Schmidt MD  09/15/2023  Answers submitted by the patient for this visit:  Primary Reason for Visit (Submitted on 2023)  What is the primary reason for your visit?: Vaginal Discharge/Irritation  Female Genital Questionnaire (Submitted on 2023)  Chief Complaint: Female genitourinary complaint  genital itching: Yes  genital odor: Yes  Chronicity: chronic  Onset: in the past 7 days  Frequency: intermittently  Progression since onset: gradually worsening  Severity of pain: mild  Affected side: both  Pregnant now?: No  anorexia: No  discolored urine: No  headaches: No  joint pain: No  joint swelling: No  painful intercourse: No  Aggravated by: nothing  Sexual activity: not sexually active  Partner with STD symptoms: no  Birth control: nothing  Menstrual history: irregular  Discharge characteristics: malodorous, milky, thick, white  Passing clots?: No  Passing tissue?: No

## 2023-09-25 LAB — REF LAB TEST METHOD: NORMAL

## 2023-09-26 RX ORDER — FLUCONAZOLE 150 MG/1
150 TABLET ORAL AS NEEDED
Qty: 2 TABLET | Refills: 0 | Status: SHIPPED | OUTPATIENT
Start: 2023-09-26

## 2023-09-26 RX ORDER — METRONIDAZOLE 500 MG/1
500 TABLET ORAL 2 TIMES DAILY
Qty: 14 TABLET | Refills: 0 | Status: SHIPPED | OUTPATIENT
Start: 2023-09-26 | End: 2023-10-03

## 2024-06-28 ENCOUNTER — OFFICE VISIT (OUTPATIENT)
Dept: FAMILY MEDICINE CLINIC | Facility: CLINIC | Age: 29
End: 2024-06-28
Payer: COMMERCIAL

## 2024-06-28 ENCOUNTER — LAB (OUTPATIENT)
Dept: LAB | Facility: HOSPITAL | Age: 29
End: 2024-06-28
Payer: COMMERCIAL

## 2024-06-28 VITALS
OXYGEN SATURATION: 99 % | DIASTOLIC BLOOD PRESSURE: 70 MMHG | BODY MASS INDEX: 22.77 KG/M2 | WEIGHT: 120.6 LBS | HEIGHT: 61 IN | SYSTOLIC BLOOD PRESSURE: 116 MMHG | HEART RATE: 85 BPM

## 2024-06-28 DIAGNOSIS — R73.02 IMPAIRED GLUCOSE TOLERANCE: ICD-10-CM

## 2024-06-28 DIAGNOSIS — Z13.1 SCREENING FOR DIABETES MELLITUS: ICD-10-CM

## 2024-06-28 DIAGNOSIS — D50.9 IRON DEFICIENCY ANEMIA, UNSPECIFIED IRON DEFICIENCY ANEMIA TYPE: ICD-10-CM

## 2024-06-28 DIAGNOSIS — Z00.00 PHYSICAL EXAM, ANNUAL: ICD-10-CM

## 2024-06-28 DIAGNOSIS — Z13.29 SCREENING FOR THYROID DISORDER: ICD-10-CM

## 2024-06-28 DIAGNOSIS — D50.8 IRON DEFICIENCY ANEMIA SECONDARY TO INADEQUATE DIETARY IRON INTAKE: ICD-10-CM

## 2024-06-28 DIAGNOSIS — Z76.89 ENCOUNTER TO ESTABLISH CARE: Primary | ICD-10-CM

## 2024-06-28 LAB
BASOPHILS # BLD AUTO: 0.02 10*3/MM3 (ref 0–0.2)
BASOPHILS NFR BLD AUTO: 0.3 % (ref 0–1.5)
DEPRECATED RDW RBC AUTO: 39.1 FL (ref 37–54)
EOSINOPHIL # BLD AUTO: 0.07 10*3/MM3 (ref 0–0.4)
EOSINOPHIL NFR BLD AUTO: 1.1 % (ref 0.3–6.2)
ERYTHROCYTE [DISTWIDTH] IN BLOOD BY AUTOMATED COUNT: 13.7 % (ref 12.3–15.4)
HBA1C MFR BLD: 5 % (ref 4.8–5.6)
HCT VFR BLD AUTO: 33.7 % (ref 34–46.6)
HGB BLD-MCNC: 10.9 G/DL (ref 12–15.9)
IMM GRANULOCYTES # BLD AUTO: 0.01 10*3/MM3 (ref 0–0.05)
IMM GRANULOCYTES NFR BLD AUTO: 0.2 % (ref 0–0.5)
LYMPHOCYTES # BLD AUTO: 2.5 10*3/MM3 (ref 0.7–3.1)
LYMPHOCYTES NFR BLD AUTO: 38 % (ref 19.6–45.3)
MCH RBC QN AUTO: 25.6 PG (ref 26.6–33)
MCHC RBC AUTO-ENTMCNC: 32.3 G/DL (ref 31.5–35.7)
MCV RBC AUTO: 79.3 FL (ref 79–97)
MONOCYTES # BLD AUTO: 0.32 10*3/MM3 (ref 0.1–0.9)
MONOCYTES NFR BLD AUTO: 4.9 % (ref 5–12)
NEUTROPHILS NFR BLD AUTO: 3.66 10*3/MM3 (ref 1.7–7)
NEUTROPHILS NFR BLD AUTO: 55.5 % (ref 42.7–76)
PLATELET # BLD AUTO: 268 10*3/MM3 (ref 140–450)
PMV BLD AUTO: 12.8 FL (ref 6–12)
RBC # BLD AUTO: 4.25 10*6/MM3 (ref 3.77–5.28)
WBC NRBC COR # BLD AUTO: 6.58 10*3/MM3 (ref 3.4–10.8)

## 2024-06-28 PROCEDURE — 83036 HEMOGLOBIN GLYCOSYLATED A1C: CPT

## 2024-06-28 PROCEDURE — 84443 ASSAY THYROID STIM HORMONE: CPT

## 2024-06-28 PROCEDURE — 82728 ASSAY OF FERRITIN: CPT

## 2024-06-28 PROCEDURE — 80053 COMPREHEN METABOLIC PANEL: CPT

## 2024-06-28 PROCEDURE — 85025 COMPLETE CBC W/AUTO DIFF WBC: CPT

## 2024-06-28 PROCEDURE — 84466 ASSAY OF TRANSFERRIN: CPT

## 2024-06-28 PROCEDURE — 83540 ASSAY OF IRON: CPT

## 2024-06-28 PROCEDURE — 99203 OFFICE O/P NEW LOW 30 MIN: CPT | Performed by: PHYSICIAN ASSISTANT

## 2024-06-28 NOTE — PROGRESS NOTES
Chief Complaint   Patient presents with    [facility]     Physical     Anemia       Julia Blunt is a 28 y.o. female who presents to [facility].  Has not been established with a primary care provider in the past.  Is currently established with gynecology, Dr. Schmidt.    Reports daytime somnolence, fatigue, feels like she might fall asleep at the wheel.  No snoring.  She goes to bed at 9pm and wakes up at 5am.  She sleeps well.  She has 3 kids and they sleep well.    She reports history of iron deficiency.  She has had to have iron infusions during pregnancy.  Unable to take iron tablets due to constipation and nausea.  Taking multi-vitamin with iron and takes 1 nightly.    She has shortness of breath at times after extended walking/exertion.    She is on Xulane patch for birth control.  She menstruates once a month and it is heavy.    Chief Complaint   Patient presents with    [facility]     Physical     Anemia       Past Medical History:   Diagnosis Date    Anemia     Anxiety     Migraine     I had them since i was a child    Preeclampsia     I had it with my first pregnancy    Pregnancy     X2       Past Surgical History:   Procedure Laterality Date    CARPAL TUNNEL RELEASE Bilateral      SECTION  2017     SECTION  2022     SECTION N/A 8/3/2023    Procedure:  SECTION REPEAT;  Surgeon: Duy Schmidt MD;  [facility];  Service: Obstetrics/Gynecology;  Laterality: N/A;    LAPAROSCOPIC CHOLECYSTECTOMY      WISDOM TOOTH EXTRACTION         Family History   Problem Relation Age of Onset    Thyroid disease Maternal Grandmother        Social History     Socioeconomic History    Marital status: Single   Tobacco Use    Smoking status: Never    Smokeless tobacco: Never   Vaping Use    Vaping status: Never Used   Substance and Sexual Activity    Alcohol use: Not Currently    Drug use: Never    Sexual activity: Yes     Partners:  "Male     Birth control/protection: Condom, Pill, I.U.D., Implant, None, Depo-provera       Allergies   Allergen Reactions    Nickel Rash       ROS    Review of Systems   Constitutional:  Positive for fatigue. Negative for chills and fever.   Respiratory:  Negative for cough, shortness of breath and wheezing.    Cardiovascular:  Negative for chest pain, palpitations and leg swelling.   Neurological:  Negative for dizziness, light-headedness and headache.   Psychiatric/Behavioral:  Negative for sleep disturbance.        Vitals:    06/28/24 1426   BP: 116/70   Pulse: 85   SpO2: 99%   Weight: 54.7 kg (120 lb 9.6 oz)   Height: 154.9 cm (61\")     Body mass index is 22.79 kg/m².    BMI is within normal parameters. No other follow-up for BMI required.      Current Outpatient Medications on File Prior to Visit   Medication Sig Dispense Refill    norelgestromin-ethinyl estradiol (Xulane) 150-35 MCG/24HR Place 1 patch on the skin as directed by provider 1 (One) Time Per Week. 4 patch 11    [DISCONTINUED] ferrous sulfate 325 (65 FE) MG tablet Take 1 tablet by mouth 2 (Two) Times a Day. (Patient not taking: Reported on 6/28/2024) 60 tablet 2    [DISCONTINUED] fluconazole (DIFLUCAN) 150 MG tablet Take 1 tablet by mouth As Needed (yeast). Take one tablet now and repeat in 3 days (Patient not taking: Reported on 6/28/2024) 2 tablet 0    [DISCONTINUED] Prenat w/o A-FeCbGl-DSS-FA-DHA (CitraNatal DHA) 27-1 & 250 MG tablet Take 1 tablet by mouth Daily. (Patient not taking: Reported on 6/28/2024) 60 tablet 12     No current facility-administered medications on file prior to visit.       Results for orders placed or performed in visit on 09/15/23   LIQUID-BASED PAP SMEAR WITH HPV GENOTYPING REGARDLESS OF INTERPRETATION (ALLYSON,COR,MAD)    Specimen: ThinPrep Vial   Result Value Ref Range    Reference Lab Report       [facility]  [address] [place], [place] [address]  Phone: [phone] or [phone]  Fax: " [phone]  Champ Miguel M.D., Medical Director    PATIENT NAME                           LABORATORY NO.  [id number]  MERARI JUNG.               [id number]  [id number]                         AGE              SEX  SSN           CLIENT REF #  [facility]                         28      1995  F    [id number]   [id number]    [address]         REQUESTING M.D.     ATTENDING [place] [address]                THOM AUSTIN  DATE COLLECTED      DATE RECEIVED      DATE REPORTED  09/15/2023          09/15/2023         2023    ThinPrep Pap with Cytyc Imaging    DIAGNOSIS:  Epithelial cell abnormality. (ASC) Atypical squamous cells of undetermined  significance.    Professional interpretation rendered by Romelia Paulino D.O., F.C.A.P. at  [facility], [address] [place] [address].  SPECIMEN ADEQUACY:   SATISFACTORY FOR EVALUATION  Transformation zone is present.  SOURCE OF SPECIMEN:  CERVICAL/ENDOCERVICAL  SLIDES:  1  CLINICAL HISTORY:  Pap test, as part of routine gynecological examination  Postpartum follow-up  Screening for cervical cancer  Vaginal discharge  Possible exposure to STD    HPV  HR-HPV POOL: Negative    The Aptima HPV assay is an in vitro nucleic acid amplification test for the  qualitative detection of E6/E7 viral messenger RNA from 14 high risk types of  HPV in cervical specimens. The high risk HPV types detected include: 16, 18,  31, 33, 35, 39, 45, 51, 52, 56, 58, 59, 66, 68    Trichomonas  TRICHOMONAS VAGINALIS: Negative    The Aptima Trichomonas vaginalis assay is an in vitro qualitative nucleic acid  amplification test for the detection of ribosomal RNA to aid in the diagnosis of  trichomoniasis.    Sendout Ancillary Vaginosis Tests  ATOPOBIUM VAGINAE: Positive  BVAB2: Negative  CANDIDA GLABRATA: Negative  CANDIDA PARAPSILOSIS: Negative  CANDIDA TROPICALIS:  Negative  MEGASPHAERA 1: Negative  MDL CANDIDA ALBICANS:  Negative    COMMENT: The above results are intended for medical diagnosis and treatment  purposes only.    Results from this assay (s) should be interpreted in conjunction with other  laboratory and clinical data. The test is not intended to differentiate carriers of  the organism from those with active infections. Therapeutic success or failure  cannot be assessed using this test because DNA may persist following  antimicrobial therapy. Nucleic acid base changes or mutations can result in false  negative reactions. This test was developed and its performance characteristics  determined by [facility] It has not been cleared  or approved by the U.S. Food and Drug Administration. The FDA has  determined that such clearance or approval is not necessary. [facility] ([facility]) is located in [place], [place]    CYTOTECHNOLOGIST:      ISABELLA HORTON ([facility])                            REVIEWED,  DIAGNOSED AND  ELECTRONICALLY SIGNED BY:      Romelia Paulino D.O., F.JOSE.A.P.    CPT CODES:  18998, 85172, 13242, 83423, 87798x3, 77481x4         PE  Physical Exam  Vitals reviewed.   Constitutional:       General: She is not in acute distress.     Appearance: Normal appearance. She is well-developed and normal weight. She is not ill-appearing or diaphoretic.   HENT:      Head: Normocephalic and atraumatic.   Eyes:      Extraocular Movements: Extraocular movements intact.      Conjunctiva/sclera: Conjunctivae normal.   Cardiovascular:      Rate and Rhythm: Normal rate and regular rhythm.      Heart sounds: Normal heart sounds.   Pulmonary:      Effort: No respiratory distress.   Musculoskeletal:         General: Normal range of motion.      Cervical back: Normal range of motion.      Right lower leg: No edema.      Left lower leg: No edema.   Skin:     General: Skin is warm.      Findings: No erythema or rash.   Neurological:      General: No focal deficit present.      Mental Status: She  is alert.   Psychiatric:         Attention and Perception: She is attentive.         Mood and Affect: Mood normal.         Speech: Speech normal.         Behavior: Behavior normal. Behavior is cooperative.         Thought Content: Thought content normal.         Judgment: Judgment normal.         A/P    Diagnoses and all orders for this visit:    1. Encounter to establish care (Primary)    2. Iron deficiency anemia secondary to inadequate dietary iron intake  -     Iron Profile; Future  -     CBC Auto Differential; Future  Taking OTC vitamin with iron.  Unable to tolerate prescription iron due to constipation and nausea.  Has had to have iron infusions when she was pregnant.  Heavy menstruation once monthly.  Will check labs.    3. Impaired glucose tolerance  -     Hemoglobin A1c; Future    4. Physical exam, annual  -     Iron Profile; Future  -     CBC Auto Differential; Future  -     Comprehensive Metabolic Panel; Future  -     TSH Rfx On Abnormal To Free T4; Future  -     Hemoglobin A1c; Future    5. Screening for diabetes mellitus  -     Comprehensive Metabolic Panel; Future    6. Screening for thyroid disorder  -     TSH Rfx On Abnormal To Free T4; Future    Return in 1 month for APE.  Will obtain lab work prior to exam.       Plan of care reviewed with patient at the conclusion of today's visit. Education was provided regarding diagnosis, management and any prescribed or recommended OTC medications.  Patient verbalizes understanding of and agreement with management plan.    Dictated Utilizing Dragon Dictation     Please note that portions of this note were completed with a voice recognition program.     Part of this note may be an electronic transcription/translation of spoken language to printed text using the Dragon Dictation System.      Return in about 4 weeks (around 7/26/2024) for Annual physical.     Pratima Crowley PA-C

## 2024-06-29 DIAGNOSIS — D50.9 IRON DEFICIENCY ANEMIA, UNSPECIFIED IRON DEFICIENCY ANEMIA TYPE: ICD-10-CM

## 2024-06-29 DIAGNOSIS — K90.9 IRON MALABSORPTION: Primary | ICD-10-CM

## 2024-06-29 DIAGNOSIS — D50.8 IRON DEFICIENCY ANEMIA SECONDARY TO INADEQUATE DIETARY IRON INTAKE: Primary | ICD-10-CM

## 2024-06-29 LAB
ALBUMIN SERPL-MCNC: 4 G/DL (ref 3.5–5.2)
ALBUMIN/GLOB SERPL: 1.2 G/DL
ALP SERPL-CCNC: 66 U/L (ref 39–117)
ALT SERPL W P-5'-P-CCNC: 25 U/L (ref 1–33)
ANION GAP SERPL CALCULATED.3IONS-SCNC: 9.1 MMOL/L (ref 5–15)
AST SERPL-CCNC: 21 U/L (ref 1–32)
BILIRUB SERPL-MCNC: <0.2 MG/DL (ref 0–1.2)
BUN SERPL-MCNC: 8 MG/DL (ref 6–20)
BUN/CREAT SERPL: 10.7 (ref 7–25)
CALCIUM SPEC-SCNC: 9.4 MG/DL (ref 8.6–10.5)
CHLORIDE SERPL-SCNC: 106 MMOL/L (ref 98–107)
CO2 SERPL-SCNC: 26.9 MMOL/L (ref 22–29)
CREAT SERPL-MCNC: 0.75 MG/DL (ref 0.57–1)
EGFRCR SERPLBLD CKD-EPI 2021: 110.7 ML/MIN/1.73
FERRITIN SERPL-MCNC: 4.99 NG/ML (ref 13–150)
GLOBULIN UR ELPH-MCNC: 3.3 GM/DL
GLUCOSE SERPL-MCNC: 77 MG/DL (ref 65–99)
IRON 24H UR-MRATE: 26 MCG/DL (ref 37–145)
IRON SATN MFR SERPL: 4 % (ref 20–50)
POTASSIUM SERPL-SCNC: 4 MMOL/L (ref 3.5–5.2)
PROT SERPL-MCNC: 7.3 G/DL (ref 6–8.5)
SODIUM SERPL-SCNC: 142 MMOL/L (ref 136–145)
TIBC SERPL-MCNC: 654 MCG/DL (ref 298–536)
TRANSFERRIN SERPL-MCNC: 439 MG/DL (ref 200–360)
TSH SERPL DL<=0.05 MIU/L-ACNC: 1.32 UIU/ML (ref 0.27–4.2)

## 2024-07-05 ENCOUNTER — PATIENT ROUNDING (BHMG ONLY) (OUTPATIENT)
Dept: FAMILY MEDICINE CLINIC | Facility: CLINIC | Age: 29
End: 2024-07-05
Payer: COMMERCIAL

## 2024-07-30 ENCOUNTER — OFFICE VISIT (OUTPATIENT)
Dept: FAMILY MEDICINE CLINIC | Facility: CLINIC | Age: 29
End: 2024-07-30
Payer: COMMERCIAL

## 2024-07-30 VITALS
DIASTOLIC BLOOD PRESSURE: 58 MMHG | HEIGHT: 61 IN | BODY MASS INDEX: 23.37 KG/M2 | WEIGHT: 123.8 LBS | RESPIRATION RATE: 16 BRPM | OXYGEN SATURATION: 100 % | SYSTOLIC BLOOD PRESSURE: 98 MMHG | HEART RATE: 82 BPM

## 2024-07-30 DIAGNOSIS — D50.8 IRON DEFICIENCY ANEMIA SECONDARY TO INADEQUATE DIETARY IRON INTAKE: ICD-10-CM

## 2024-07-30 DIAGNOSIS — K12.0 APHTHOUS ULCERATION: ICD-10-CM

## 2024-07-30 DIAGNOSIS — Z00.00 PHYSICAL EXAM, ANNUAL: Primary | ICD-10-CM

## 2024-07-30 PROCEDURE — 99395 PREV VISIT EST AGE 18-39: CPT | Performed by: PHYSICIAN ASSISTANT

## 2024-07-30 NOTE — PROGRESS NOTES
Chief Complaint   Patient presents with    Annual Exam       Julia Blunt is a pleasant 29 y.o. female who is here for annual physical exam.  Patient continues to have fatigue and reports sores in her mouth.  She has been on multiple different types of birth control.  IUD caused menorrhagia.  She got pregnant while on birth control pills.  Had nexplanon but this didn't work.  She is now on Xulane patch and this works better than anything she has ever taken.  She is established with gynecology.  Has significant iron deficiency anemia.  Unable to tolerate iron tablets.  Had to get iron infusions while pregnant.  Has upcoming appointment with hematology, Dr. Meneses.  Going to dentist and ophthalmologist regularly.      Past Medical History:   Diagnosis Date    Anemia     Anxiety     Migraine     I had them since i was a child    Pneumonia     Preeclampsia     I had it with my first pregnancy    Pregnancy     X2       Past Surgical History:   Procedure Laterality Date    CARPAL TUNNEL RELEASE Bilateral      SECTION  2017     SECTION  2022     SECTION N/A 8/3/2023    Procedure:  SECTION REPEAT;  Surgeon: Duy Schmidt MD;  Location: [facility] LABOR DELIVERY;  Service: Obstetrics/Gynecology;  Laterality: N/A;    LAPAROSCOPIC CHOLECYSTECTOMY      WISDOM TOOTH EXTRACTION         Family History   Problem Relation Age of Onset    Thyroid disease Maternal Grandmother        Social History     Socioeconomic History    Marital status: Single   Tobacco Use    Smoking status: Never    Smokeless tobacco: Never   Vaping Use    Vaping status: Never Used   Substance and Sexual Activity    Alcohol use: Not Currently    Drug use: Never    Sexual activity: Yes     Partners: Male     Birth control/protection: Condom, I.U.D., Patch, None, Depo-provera, Pill, Implant       Allergies   Allergen Reactions    Nickel Rash       ROS  Review of Systems   Constitutional:  Positive for  "fatigue. Negative for chills, diaphoresis and fever.   HENT:  Positive for mouth sores. Negative for congestion, ear pain, hearing loss, postnasal drip, rhinorrhea and sore throat.    Eyes:  Negative for blurred vision and pain.   Respiratory:  Negative for cough, shortness of breath and wheezing.    Cardiovascular:  Negative for chest pain and leg swelling.   Gastrointestinal:  Negative for abdominal pain, blood in stool, constipation, diarrhea, nausea, vomiting and indigestion.   Endocrine: Negative for polyuria.   Genitourinary:  Negative for dysuria, flank pain and hematuria.   Musculoskeletal:  Negative for arthralgias, gait problem and myalgias.   Skin:  Negative for rash and skin lesions.   Neurological:  Negative for dizziness and headache.   Psychiatric/Behavioral:  Negative for self-injury, sleep disturbance, suicidal ideas, depressed mood and stress. The patient is not nervous/anxious.        Vitals:    07/30/24 1419   BP: 98/58   Pulse: 82   Resp: 16   SpO2: 100%   Weight: 56.2 kg (123 lb 12.8 oz)   Height: 154.9 cm (61\")     Body mass index is 23.39 kg/m².    BMI is within normal parameters. No other follow-up for BMI required.       Current Outpatient Medications on File Prior to Visit   Medication Sig Dispense Refill    norelgestromin-ethinyl estradiol (Xulane) 150-35 MCG/24HR Place 1 patch on the skin as directed by provider 1 (One) Time Per Week. 4 patch 11     No current facility-administered medications on file prior to visit.       Results for orders placed or performed in visit on 06/28/24   Iron Profile    Specimen: Blood   Result Value Ref Range    Iron 26 (L) 37 - 145 mcg/dL    Iron Saturation (TSAT) 4 (L) 20 - 50 %    Transferrin 439 (H) 200 - 360 mg/dL    TIBC 654 (H) 298 - 536 mcg/dL   CBC Auto Differential    Specimen: Blood   Result Value Ref Range    WBC 6.58 3.40 - 10.80 10*3/mm3    RBC 4.25 3.77 - 5.28 10*6/mm3    Hemoglobin 10.9 (L) 12.0 - 15.9 g/dL    Hematocrit 33.7 (L) 34.0 - 46.6 " %    MCV 79.3 79.0 - 97.0 fL    MCH 25.6 (L) 26.6 - 33.0 pg    MCHC 32.3 31.5 - 35.7 g/dL    RDW 13.7 12.3 - 15.4 %    RDW-SD 39.1 37.0 - 54.0 fl    MPV 12.8 (H) 6.0 - 12.0 fL    Platelets 268 140 - 450 10*3/mm3    Neutrophil % 55.5 42.7 - 76.0 %    Lymphocyte % 38.0 19.6 - 45.3 %    Monocyte % 4.9 (L) 5.0 - 12.0 %    Eosinophil % 1.1 0.3 - 6.2 %    Basophil % 0.3 0.0 - 1.5 %    Immature Grans % 0.2 0.0 - 0.5 %    Neutrophils, Absolute 3.66 1.70 - 7.00 10*3/mm3    Lymphocytes, Absolute 2.50 0.70 - 3.10 10*3/mm3    Monocytes, Absolute 0.32 0.10 - 0.90 10*3/mm3    Eosinophils, Absolute 0.07 0.00 - 0.40 10*3/mm3    Basophils, Absolute 0.02 0.00 - 0.20 10*3/mm3    Immature Grans, Absolute 0.01 0.00 - 0.05 10*3/mm3   Comprehensive Metabolic Panel    Specimen: Blood   Result Value Ref Range    Glucose 77 65 - 99 mg/dL    BUN 8 6 - 20 mg/dL    Creatinine 0.75 0.57 - 1.00 mg/dL    Sodium 142 136 - 145 mmol/L    Potassium 4.0 3.5 - 5.2 mmol/L    Chloride 106 98 - 107 mmol/L    CO2 26.9 22.0 - 29.0 mmol/L    Calcium 9.4 8.6 - 10.5 mg/dL    Total Protein 7.3 6.0 - 8.5 g/dL    Albumin 4.0 3.5 - 5.2 g/dL    ALT (SGPT) 25 1 - 33 U/L    AST (SGOT) 21 1 - 32 U/L    Alkaline Phosphatase 66 39 - 117 U/L    Total Bilirubin <0.2 0.0 - 1.2 mg/dL    Globulin 3.3 gm/dL    A/G Ratio 1.2 g/dL    BUN/Creatinine Ratio 10.7 7.0 - 25.0    Anion Gap 9.1 5.0 - 15.0 mmol/L    eGFR 110.7 >60.0 mL/min/1.73   TSH Rfx On Abnormal To Free T4    Specimen: Blood   Result Value Ref Range    TSH 1.320 0.270 - 4.200 uIU/mL   Hemoglobin A1c    Specimen: Blood   Result Value Ref Range    Hemoglobin A1C 5.00 4.80 - 5.60 %   Ferritin    Specimen: Blood   Result Value Ref Range    Ferritin 4.99 (L) 13.00 - 150.00 ng/mL       PE    Physical Exam  Vitals reviewed.   Constitutional:       General: She is not in acute distress.     Appearance: Normal appearance. She is well-developed and normal weight. She is not ill-appearing or diaphoretic.   HENT:      Head:  Normocephalic and atraumatic.      Right Ear: Hearing, tympanic membrane, ear canal and external ear normal.      Left Ear: Hearing, tympanic membrane, ear canal and external ear normal.      Nose: Nose normal.      Right Sinus: No maxillary sinus tenderness or frontal sinus tenderness.      Left Sinus: No maxillary sinus tenderness or frontal sinus tenderness.      Mouth/Throat:      Mouth: Mucous membranes are moist. Oral lesions present.      Pharynx: Uvula midline.   Eyes:      General: Lids are normal.      Extraocular Movements: Extraocular movements intact.      Conjunctiva/sclera: Conjunctivae normal.   Neck:      Thyroid: No thyroid mass or thyromegaly.      Trachea: Trachea and phonation normal.   Cardiovascular:      Rate and Rhythm: Normal rate and regular rhythm.      Heart sounds: Normal heart sounds.   Pulmonary:      Effort: Pulmonary effort is normal.      Breath sounds: Normal breath sounds.   Abdominal:      General: Bowel sounds are normal. There is no distension.      Palpations: Abdomen is soft. Abdomen is not rigid.      Tenderness: There is no abdominal tenderness. There is no guarding.   Musculoskeletal:         General: Normal range of motion.      Cervical back: Normal range of motion.      Right lower leg: No edema.      Left lower leg: No edema.   Lymphadenopathy:      Cervical: No cervical adenopathy.      Right cervical: No superficial cervical adenopathy.     Left cervical: No superficial cervical adenopathy.   Skin:     General: Skin is warm.      Findings: No erythema or rash.      Nails: There is no clubbing.   Neurological:      Mental Status: She is alert and oriented to person, place, and time.      Coordination: Coordination normal.      Gait: Gait normal.      Deep Tendon Reflexes: Reflexes are normal and symmetric.      Comments: CN grossly intact   Psychiatric:         Attention and Perception: Attention and perception normal. She is attentive.         Mood and Affect: Mood  and affect normal.         Speech: Speech normal.         Behavior: Behavior normal. Behavior is cooperative.         Thought Content: Thought content normal.         Cognition and Memory: Cognition and memory normal.         Judgment: Judgment normal.         A/P    Diagnoses and all orders for this visit:    1. Physical exam, annual (Primary)  PE completed  Preventative labs reviewed with patient  Gynecologist - established, up-to-date  Dentist - encouraged to go regularly  Ophthalmologist - encouraged to go regularly  Dermatologist - denies any moles or lesions of concern  Vaccinations discussed    2. Iron deficiency anemia secondary to inadequate dietary iron intake  Has upcoming appointment with Dr. Meneses.    3. Aphthous ulceration  May be related to iron deficiency.         Plan of care reviewed with patient at the conclusion of today's visit. Education was provided regarding nutrition , exercise, supplements, preventative screenings, and vaccinations diagnosis, management and any prescribed or recommended OTC medications.  Patient verbalizes understanding of and agreement with management plan.    Dictated Utilizing Dragon Dictation     Please note that portions of this note were completed with a voice recognition program.     Part of this note may be an electronic transcription/translation of spoken language to printed text using the Dragon Dictation System.    Return in about 1 year (around 7/31/2025) for Annual physical.     Pratima Crowley PA-C

## 2024-08-12 ENCOUNTER — LAB (OUTPATIENT)
Dept: LAB | Facility: HOSPITAL | Age: 29
End: 2024-08-12
Payer: COMMERCIAL

## 2024-08-12 ENCOUNTER — CONSULT (OUTPATIENT)
Dept: ONCOLOGY | Facility: CLINIC | Age: 29
End: 2024-08-12
Payer: COMMERCIAL

## 2024-08-12 VITALS
BODY MASS INDEX: 23.22 KG/M2 | TEMPERATURE: 97 F | HEIGHT: 61 IN | OXYGEN SATURATION: 100 % | SYSTOLIC BLOOD PRESSURE: 119 MMHG | WEIGHT: 123 LBS | RESPIRATION RATE: 16 BRPM | DIASTOLIC BLOOD PRESSURE: 81 MMHG | HEART RATE: 70 BPM

## 2024-08-12 DIAGNOSIS — D50.8 IRON DEFICIENCY ANEMIA SECONDARY TO INADEQUATE DIETARY IRON INTAKE: ICD-10-CM

## 2024-08-12 DIAGNOSIS — D50.8 IRON DEFICIENCY ANEMIA SECONDARY TO INADEQUATE DIETARY IRON INTAKE: Primary | ICD-10-CM

## 2024-08-12 LAB
BASOPHILS # BLD AUTO: 0.01 10*3/MM3 (ref 0–0.2)
BASOPHILS NFR BLD AUTO: 0.2 % (ref 0–1.5)
DEPRECATED RDW RBC AUTO: 41 FL (ref 37–54)
EOSINOPHIL # BLD AUTO: 0.09 10*3/MM3 (ref 0–0.4)
EOSINOPHIL NFR BLD AUTO: 1.6 % (ref 0.3–6.2)
ERYTHROCYTE [DISTWIDTH] IN BLOOD BY AUTOMATED COUNT: 14.3 % (ref 12.3–15.4)
FERRITIN SERPL-MCNC: 7.35 NG/ML (ref 13–150)
HCT VFR BLD AUTO: 36.1 % (ref 34–46.6)
HGB BLD-MCNC: 11.4 G/DL (ref 12–15.9)
IMM GRANULOCYTES # BLD AUTO: 0.01 10*3/MM3 (ref 0–0.05)
IMM GRANULOCYTES NFR BLD AUTO: 0.2 % (ref 0–0.5)
IRON 24H UR-MRATE: 25 MCG/DL (ref 37–145)
IRON SATN MFR SERPL: 4 % (ref 20–50)
LYMPHOCYTES # BLD AUTO: 2.56 10*3/MM3 (ref 0.7–3.1)
LYMPHOCYTES NFR BLD AUTO: 45.2 % (ref 19.6–45.3)
MCH RBC QN AUTO: 24.8 PG (ref 26.6–33)
MCHC RBC AUTO-ENTMCNC: 31.6 G/DL (ref 31.5–35.7)
MCV RBC AUTO: 78.5 FL (ref 79–97)
MONOCYTES # BLD AUTO: 0.24 10*3/MM3 (ref 0.1–0.9)
MONOCYTES NFR BLD AUTO: 4.2 % (ref 5–12)
NEUTROPHILS NFR BLD AUTO: 2.75 10*3/MM3 (ref 1.7–7)
NEUTROPHILS NFR BLD AUTO: 48.6 % (ref 42.7–76)
PLATELET # BLD AUTO: 228 10*3/MM3 (ref 140–450)
PMV BLD AUTO: 10.3 FL (ref 6–12)
RBC # BLD AUTO: 4.6 10*6/MM3 (ref 3.77–5.28)
TIBC SERPL-MCNC: 645 MCG/DL (ref 298–536)
TRANSFERRIN SERPL-MCNC: 433 MG/DL (ref 200–360)
WBC NRBC COR # BLD AUTO: 5.66 10*3/MM3 (ref 3.4–10.8)

## 2024-08-12 PROCEDURE — 85025 COMPLETE CBC W/AUTO DIFF WBC: CPT

## 2024-08-12 PROCEDURE — 82728 ASSAY OF FERRITIN: CPT

## 2024-08-12 PROCEDURE — 99205 OFFICE O/P NEW HI 60 MIN: CPT | Performed by: INTERNAL MEDICINE

## 2024-08-12 PROCEDURE — 84466 ASSAY OF TRANSFERRIN: CPT

## 2024-08-12 PROCEDURE — 36415 COLL VENOUS BLD VENIPUNCTURE: CPT

## 2024-08-12 PROCEDURE — 83540 ASSAY OF IRON: CPT

## 2024-08-12 RX ORDER — FERROUS GLUCONATE 324(38)MG
TABLET ORAL
Qty: 45 TABLET | Refills: 3 | Status: SHIPPED | OUTPATIENT
Start: 2024-08-12

## 2024-08-12 NOTE — LETTER
August 12, 2024       No Recipients    Patient: Julia Blunt   YOB: 1995   Date of Visit: 8/12/2024     Dear Pratima Crowley PA-C:       Thank you for referring Julia Blunt to me for evaluation. Below are the relevant portions of my assessment and plan of care.    If you have questions, please do not hesitate to call me. I look forward to following Julia along with you.         Sincerely,        Vasquez Meneses MD        CC:   No Recipients    Vasquez Meneses MD  08/12/24 1118  Sign when Signing Visit  CHIEF COMPLAINT: No new somatic complaints    REASON FOR REFERRAL: Iron deficiency anemia of menorrhagia with oral iron intolerance history      RECORDS OBTAINED  Records of the patients history including those obtained from primary care were reviewed and summarized in detail.    Oncology/Hematology History Overview Note   1.  Iron deficiency anemia intolerant of oral iron received IV iron while pregnant.    2.  Menorrhagia.  3.  History of preeclampsia with first pregnancy  4.  Migraines    -8/12/2024 [facility] hematology consult: Patient comes for evaluation of menorrhagia-induced iron deficiency anemia.  6/28/2024 hemoglobin 10.9 up from 8.0 June 2023 during pregnancy.  MCV 79 up from 70 during pregnancy.  Normal white count and platelet count and unremarkable differential.  Iron low 26 saturation 4% elevated total iron binding capacity 654.  Elevated transferrin 439.  Ferritin low 4.99.  CMP unremarkable.  Had previously taken IV iron during her pregnancy with Dr. Alessandro Miller in [place] without complications but does not know what type of IV iron she took.  She has not tried ferrous gluconate that she is aware of.  Will try her on that every other day to maximize absorption and she will take FiberCon to combat constipation and if that does not do enough she will take MiraLAX.  Will check her iron studies and CBC today and just prior to return in 4 months.  If that does not work then we  will try Venofer which her insurance should cover.  She does understand there is a risk of anaphylaxis that is greater with IV iron then with oral.     Iron deficiency anemia secondary to inadequate dietary iron intake       HISTORY OF PRESENT ILLNESS:  The patient is a 29 y.o.  female, referred for menorrhagia-induced iron deficiency anemia with history of oral iron intolerance.  Took IV iron while pregnant but not sure what type.  No reactions thus far.    REVIEW OF SYSTEMS:  No somatic complaints.    Past Medical History:   Diagnosis Date   • Anemia    • Anxiety    • Migraine     I had them since i was a child   • Pneumonia    • Preeclampsia     I had it with my first pregnancy   • Pregnancy     X2     Past Surgical History:   Procedure Laterality Date   • CARPAL TUNNEL RELEASE Bilateral    •  SECTION  2017   •  SECTION  2022   •  SECTION N/A 8/3/2023    Procedure:  SECTION REPEAT;  Surgeon: Duy Schmidt MD;  Location: [facility] LABOR DELIVERY;  Service: Obstetrics/Gynecology;  Laterality: N/A;   • LAPAROSCOPIC CHOLECYSTECTOMY     • WISDOM TOOTH EXTRACTION         Current Outpatient Medications on File Prior to Visit   Medication Sig Dispense Refill   • norelgestromin-ethinyl estradiol (Xulane) 150-35 MCG/24HR Place 1 patch on the skin as directed by provider 1 (One) Time Per Week. 4 patch 11     No current facility-administered medications on file prior to visit.       Allergies   Allergen Reactions   • Nickel Rash       Social History     Socioeconomic History   • Marital status: Single   Tobacco Use   • Smoking status: Never   • Smokeless tobacco: Never   Vaping Use   • Vaping status: Never Used   Substance and Sexual Activity   • Alcohol use: Not Currently   • Drug use: Never   • Sexual activity: Yes     Partners: Male     Birth control/protection: Condom, I.U.D., Patch, None, Depo-provera, Pill, Implant       Family History   Problem Relation Age of Onset  "  • Thyroid disease Maternal Grandmother        PHYSICAL EXAM:  Jaundice icterus or pallor.  No respiratory distress.    /81   Pulse 70   Temp 97 °F (36.1 °C)   Resp 16   Ht 154.9 cm (60.98\")   Wt 55.8 kg (123 lb)   SpO2 100%   BMI 23.25 kg/m²     ECOG score: 0           ECOG: (0) Fully Active - Able to Carry On All Pre-disease Performance Without Restriction    Lab Results   Component Value Date    HGB 10.9 (L) 06/28/2024    HCT 33.7 (L) 06/28/2024    MCV 79.3 06/28/2024     06/28/2024    WBC 6.58 06/28/2024    NEUTROABS 3.66 06/28/2024    LYMPHSABS 2.50 06/28/2024    MONOSABS 0.32 06/28/2024    EOSABS 0.07 06/28/2024    BASOSABS 0.02 06/28/2024     Lab Results   Component Value Date    GLUCOSE 77 06/28/2024    BUN 8 06/28/2024    CREATININE 0.75 06/28/2024     06/28/2024    K 4.0 06/28/2024     06/28/2024    CO2 26.9 06/28/2024    CALCIUM 9.4 06/28/2024    PROTEINTOT 7.3 06/28/2024    ALBUMIN 4.0 06/28/2024    BILITOT <0.2 06/28/2024    ALKPHOS 66 06/28/2024    AST 21 06/28/2024    ALT 25 06/28/2024         Assessment & Plan  1.  Iron deficiency anemia intolerant of oral iron received IV iron while pregnant.    2.  Menorrhagia.  3.  History of preeclampsia with first pregnancy  4.  Migraines    -8/12/2024 [facility] hematology consult: Patient comes for evaluation of menorrhagia-induced iron deficiency anemia.  6/28/2024 hemoglobin 10.9 up from 8.0 June 2023 during pregnancy.  MCV 79 up from 70 during pregnancy.  Normal white count and platelet count and unremarkable differential.  Iron low 26 saturation 4% elevated total iron binding capacity 654.  Elevated transferrin 439.  Ferritin low 4.99.  CMP unremarkable.  Had previously taken IV iron during her pregnancy with Dr. Alessandro Miller in [place] without complications but does not know what type of IV iron she took.  She has not tried ferrous gluconate that she is aware of.  Will try her on that every other day to maximize absorption " and she will take FiberCon to combat constipation and if that does not do enough she will take MiraLAX.  Will check her iron studies and CBC today and just prior to return in 4 months.  If that does not work then we will try Venofer which her insurance should cover.  She does understand there is a risk of anaphylaxis that is greater with IV iron then with oral.    Total time of care today inclusive of time spent today prior to patient's arrival reviewing past records and cataloging results as outlined above and after visit instituting this plan took 1 hour patient care time throughout the day today.      Vasquez Meneses MD    8/12/2024

## 2024-08-12 NOTE — PROGRESS NOTES
CHIEF COMPLAINT: No new somatic complaints    REASON FOR REFERRAL: Iron deficiency anemia of menorrhagia with oral iron intolerance history      RECORDS OBTAINED  Records of the patients history including those obtained from primary care were reviewed and summarized in detail.    Oncology/Hematology History Overview Note   1.  Iron deficiency anemia intolerant of oral iron received IV iron while pregnant.    2.  Menorrhagia.  3.  History of preeclampsia with first pregnancy  4.  Migraines    -8/12/2024 [facility] hematology consult: Patient comes for evaluation of menorrhagia-induced iron deficiency anemia.  6/28/2024 hemoglobin 10.9 up from 8.0 June 2023 during pregnancy.  MCV 79 up from 70 during pregnancy.  Normal white count and platelet count and unremarkable differential.  Iron low 26 saturation 4% elevated total iron binding capacity 654.  Elevated transferrin 439.  Ferritin low 4.99.  CMP unremarkable.  Had previously taken IV iron during her pregnancy with Dr. Alessandro Miller in [place] without complications but does not know what type of IV iron she took.  She has not tried ferrous gluconate that she is aware of.  Will try her on that every other day to maximize absorption and she will take FiberCon to combat constipation and if that does not do enough she will take MiraLAX.  Will check her iron studies and CBC today and just prior to return in 4 months.  If that does not work then we will try Venofer which her insurance should cover.  She does understand there is a risk of anaphylaxis that is greater with IV iron then with oral.     Iron deficiency anemia secondary to inadequate dietary iron intake       HISTORY OF PRESENT ILLNESS:  The patient is a 29 y.o.  female, referred for menorrhagia-induced iron deficiency anemia with history of oral iron intolerance.  Took IV iron while pregnant but not sure what type.  No reactions thus far.    REVIEW OF SYSTEMS:  No somatic complaints.    Past Medical History:  "  Diagnosis Date    Anemia     Anxiety     Migraine     I had them since i was a child    Pneumonia     Preeclampsia     I had it with my first pregnancy    Pregnancy     X2     Past Surgical History:   Procedure Laterality Date    CARPAL TUNNEL RELEASE Bilateral      SECTION  2017     SECTION  2022     SECTION N/A 8/3/2023    Procedure:  SECTION REPEAT;  Surgeon: Duy Schmidt MD;  Location: [facility] LABOR DELIVERY;  Service: Obstetrics/Gynecology;  Laterality: N/A;    LAPAROSCOPIC CHOLECYSTECTOMY      WISDOM TOOTH EXTRACTION         Current Outpatient Medications on File Prior to Visit   Medication Sig Dispense Refill    norelgestromin-ethinyl estradiol (Xulane) 150-35 MCG/24HR Place 1 patch on the skin as directed by provider 1 (One) Time Per Week. 4 patch 11     No current facility-administered medications on file prior to visit.       Allergies   Allergen Reactions    Nickel Rash       Social History     Socioeconomic History    Marital status: Single   Tobacco Use    Smoking status: Never    Smokeless tobacco: Never   Vaping Use    Vaping status: Never Used   Substance and Sexual Activity    Alcohol use: Not Currently    Drug use: Never    Sexual activity: Yes     Partners: Male     Birth control/protection: Condom, I.U.D., Patch, None, Depo-provera, Pill, Implant       Family History   Problem Relation Age of Onset    Thyroid disease Maternal Grandmother        PHYSICAL EXAM:  Jaundice icterus or pallor.  No respiratory distress.    /81   Pulse 70   Temp 97 °F (36.1 °C)   Resp 16   Ht 154.9 cm (60.98\")   Wt 55.8 kg (123 lb)   SpO2 100%   BMI 23.25 kg/m²     ECOG score: 0           ECOG: (0) Fully Active - Able to Carry On All Pre-disease Performance Without Restriction    Lab Results   Component Value Date    HGB 10.9 (L) 2024    HCT 33.7 (L) 2024    MCV 79.3 2024     2024    WBC 6.58 2024    NEUTROABS 3.66 " 06/28/2024    LYMPHSABS 2.50 06/28/2024    MONOSABS 0.32 06/28/2024    EOSABS 0.07 06/28/2024    BASOSABS 0.02 06/28/2024     Lab Results   Component Value Date    GLUCOSE 77 06/28/2024    BUN 8 06/28/2024    CREATININE 0.75 06/28/2024     06/28/2024    K 4.0 06/28/2024     06/28/2024    CO2 26.9 06/28/2024    CALCIUM 9.4 06/28/2024    PROTEINTOT 7.3 06/28/2024    ALBUMIN 4.0 06/28/2024    BILITOT <0.2 06/28/2024    ALKPHOS 66 06/28/2024    AST 21 06/28/2024    ALT 25 06/28/2024         Assessment & Plan   1.  Iron deficiency anemia intolerant of oral iron received IV iron while pregnant.    2.  Menorrhagia.  3.  History of preeclampsia with first pregnancy  4.  Migraines    -8/12/2024 [facility] hematology consult: Patient comes for evaluation of menorrhagia-induced iron deficiency anemia.  6/28/2024 hemoglobin 10.9 up from 8.0 June 2023 during pregnancy.  MCV 79 up from 70 during pregnancy.  Normal white count and platelet count and unremarkable differential.  Iron low 26 saturation 4% elevated total iron binding capacity 654.  Elevated transferrin 439.  Ferritin low 4.99.  CMP unremarkable.  Had previously taken IV iron during her pregnancy with Dr. Alessandro Miller in [place] without complications but does not know what type of IV iron she took.  She has not tried ferrous gluconate that she is aware of.  Will try her on that every other day to maximize absorption and she will take FiberCon to combat constipation and if that does not do enough she will take MiraLAX.  Will check her iron studies and CBC today and just prior to return in 4 months.  If that does not work then we will try Venofer which her insurance should cover.  She does understand there is a risk of anaphylaxis that is greater with IV iron then with oral.    Total time of care today inclusive of time spent today prior to patient's arrival reviewing past records and cataloging results as outlined above and after visit instituting this plan  took 1 hour patient care time throughout the day today.      Vasquez Meneses MD    8/12/2024

## 2024-09-25 ENCOUNTER — OFFICE VISIT (OUTPATIENT)
Dept: OBSTETRICS AND GYNECOLOGY | Facility: CLINIC | Age: 29
End: 2024-09-25
Payer: COMMERCIAL

## 2024-09-25 VITALS
SYSTOLIC BLOOD PRESSURE: 108 MMHG | WEIGHT: 124.6 LBS | BODY MASS INDEX: 24.46 KG/M2 | HEIGHT: 60 IN | DIASTOLIC BLOOD PRESSURE: 60 MMHG

## 2024-09-25 DIAGNOSIS — Z30.011 VISIT FOR ORAL CONTRACEPTIVE PRESCRIPTION: ICD-10-CM

## 2024-09-25 DIAGNOSIS — Z12.4 SCREENING FOR CERVICAL CANCER: Primary | ICD-10-CM

## 2024-09-25 DIAGNOSIS — D50.8 IRON DEFICIENCY ANEMIA SECONDARY TO INADEQUATE DIETARY IRON INTAKE: ICD-10-CM

## 2024-09-25 DIAGNOSIS — N92.1 MENORRHAGIA WITH IRREGULAR CYCLE: ICD-10-CM

## 2024-09-25 DIAGNOSIS — Z30.09 BIRTH CONTROL COUNSELING: ICD-10-CM

## 2024-09-25 PROCEDURE — 99395 PREV VISIT EST AGE 18-39: CPT | Performed by: OBSTETRICS & GYNECOLOGY

## 2024-09-25 PROCEDURE — 99213 OFFICE O/P EST LOW 20 MIN: CPT | Performed by: OBSTETRICS & GYNECOLOGY

## 2024-10-04 LAB — REF LAB TEST METHOD: NORMAL

## 2024-12-13 ENCOUNTER — TELEPHONE (OUTPATIENT)
Dept: OBSTETRICS AND GYNECOLOGY | Facility: CLINIC | Age: 29
End: 2024-12-13
Payer: COMMERCIAL

## 2024-12-13 ENCOUNTER — LAB (OUTPATIENT)
Dept: LAB | Facility: HOSPITAL | Age: 29
End: 2024-12-13
Payer: COMMERCIAL

## 2024-12-13 ENCOUNTER — OFFICE VISIT (OUTPATIENT)
Dept: ONCOLOGY | Facility: CLINIC | Age: 29
End: 2024-12-13
Payer: COMMERCIAL

## 2024-12-13 VITALS
RESPIRATION RATE: 14 BRPM | BODY MASS INDEX: 23.79 KG/M2 | HEIGHT: 61 IN | SYSTOLIC BLOOD PRESSURE: 115 MMHG | WEIGHT: 126 LBS | HEART RATE: 76 BPM | TEMPERATURE: 97.3 F | OXYGEN SATURATION: 100 % | DIASTOLIC BLOOD PRESSURE: 78 MMHG

## 2024-12-13 DIAGNOSIS — D50.8 IRON DEFICIENCY ANEMIA SECONDARY TO INADEQUATE DIETARY IRON INTAKE: Primary | ICD-10-CM

## 2024-12-13 DIAGNOSIS — D50.8 IRON DEFICIENCY ANEMIA SECONDARY TO INADEQUATE DIETARY IRON INTAKE: ICD-10-CM

## 2024-12-13 LAB
BASOPHILS # BLD AUTO: 0.02 10*3/MM3 (ref 0–0.2)
BASOPHILS NFR BLD AUTO: 0.4 % (ref 0–1.5)
DEPRECATED RDW RBC AUTO: 43.4 FL (ref 37–54)
EOSINOPHIL # BLD AUTO: 0.07 10*3/MM3 (ref 0–0.4)
EOSINOPHIL NFR BLD AUTO: 1.3 % (ref 0.3–6.2)
ERYTHROCYTE [DISTWIDTH] IN BLOOD BY AUTOMATED COUNT: 15.1 % (ref 12.3–15.4)
FERRITIN SERPL-MCNC: 12.19 NG/ML (ref 13–150)
HCT VFR BLD AUTO: 35.1 % (ref 34–46.6)
HGB BLD-MCNC: 11.1 G/DL (ref 12–15.9)
IMM GRANULOCYTES # BLD AUTO: 0.01 10*3/MM3 (ref 0–0.05)
IMM GRANULOCYTES NFR BLD AUTO: 0.2 % (ref 0–0.5)
IRON 24H UR-MRATE: 29 MCG/DL (ref 37–145)
IRON SATN MFR SERPL: 6 % (ref 20–50)
LYMPHOCYTES # BLD AUTO: 2.35 10*3/MM3 (ref 0.7–3.1)
LYMPHOCYTES NFR BLD AUTO: 42.2 % (ref 19.6–45.3)
MCH RBC QN AUTO: 24.8 PG (ref 26.6–33)
MCHC RBC AUTO-ENTMCNC: 31.6 G/DL (ref 31.5–35.7)
MCV RBC AUTO: 78.3 FL (ref 79–97)
MONOCYTES # BLD AUTO: 0.31 10*3/MM3 (ref 0.1–0.9)
MONOCYTES NFR BLD AUTO: 5.6 % (ref 5–12)
NEUTROPHILS NFR BLD AUTO: 2.81 10*3/MM3 (ref 1.7–7)
NEUTROPHILS NFR BLD AUTO: 50.3 % (ref 42.7–76)
PLATELET # BLD AUTO: 251 10*3/MM3 (ref 140–450)
PMV BLD AUTO: 10.9 FL (ref 6–12)
RBC # BLD AUTO: 4.48 10*6/MM3 (ref 3.77–5.28)
TIBC SERPL-MCNC: 516 MCG/DL (ref 298–536)
TRANSFERRIN SERPL-MCNC: 346 MG/DL (ref 200–360)
WBC NRBC COR # BLD AUTO: 5.57 10*3/MM3 (ref 3.4–10.8)

## 2024-12-13 PROCEDURE — 85025 COMPLETE CBC W/AUTO DIFF WBC: CPT

## 2024-12-13 PROCEDURE — 84466 ASSAY OF TRANSFERRIN: CPT

## 2024-12-13 PROCEDURE — 36415 COLL VENOUS BLD VENIPUNCTURE: CPT

## 2024-12-13 PROCEDURE — 99214 OFFICE O/P EST MOD 30 MIN: CPT | Performed by: INTERNAL MEDICINE

## 2024-12-13 PROCEDURE — 82728 ASSAY OF FERRITIN: CPT

## 2024-12-13 PROCEDURE — 83540 ASSAY OF IRON: CPT

## 2024-12-13 NOTE — LETTER
December 13, 2024     Pratima Crowley PA-C  [address] [facility] [address]    Patient: Julia Blunt   YOB: 1995   Date of Visit: 12/13/2024     Dear Pratima Crowley PA-C:       Thank you for referring Julia Blunt to me for evaluation. Below are the relevant portions of my assessment and plan of care.    If you have questions, please do not hesitate to call me. I look forward to following Julia along with you.         Sincerely,        Vasquez Meneses MD        CC: MD Zaira James Lee G, MD  12/13/24 1209  Sign when Signing Visit  CHIEF COMPLAINT: Iron deficiency anemia without fatigue    Problem List:  Oncology/Hematology History Overview Note   1.  Iron deficiency anemia intolerant of oral iron received IV iron while pregnant.    2.  Menorrhagia.  3.  History of preeclampsia with first pregnancy  4.  Migraines    -8/12/2024 [facility] hematology consult: Patient comes for evaluation of menorrhagia-induced iron deficiency anemia.  6/28/2024 hemoglobin 10.9 up from 8.0 June 2023 during pregnancy.  MCV 79 up from 70 during pregnancy.  Normal white count and platelet count and unremarkable differential.  Iron low 26 saturation 4% elevated total iron binding capacity 654.  Elevated transferrin 439.  Ferritin low 4.99.  CMP unremarkable.  Had previously taken IV iron during her pregnancy with Dr. Alessandro Miller in [place] without complications but does not know what type of IV iron she took.  She has not tried ferrous gluconate that she is aware of.  Will try her on that every other day to maximize absorption and she will take FiberCon to combat constipation and if that does not do enough she will take MiraLAX.  Will check her iron studies and CBC today and just prior to return in 4 months.  If that does not work then we will try Venofer which her insurance should cover.  She does understand there is a risk of anaphylaxis that is greater with IV iron then with  oral.    -- 2024 [facility] hematology follow-up: She did not get her labs prior to return.  Hemoglobin as she came in the door was 11.1 stable from August with MCV stable 78 suggesting persistent mild iron deficiency with ferritin and iron profile pending.  Both of these are up from hemoglobin in the tens back in 2024 and 2023.  For now she is feeling fairly fit and we will press on with the oral iron and I will see her in 6 months with CBC and iron indices prior to return.  If she is feeling worse and over the hemoglobin drops and/or the vaginal bleeding worsens which is still severe and we cannot keep up with the iron loss with oral iron, then we will consider Venofer.     Iron deficiency anemia secondary to inadequate dietary iron intake       HISTORY OF PRESENT ILLNESS:  The patient is a 29 y.o. female, here for follow up on management of iron deficiency anemia due to heavy menses without fatigue    Past Medical History:   Diagnosis Date   • Anemia    • Anxiety    • Migraine     I had them since i was a child   • Pneumonia    • Postpartum care following  delivery - 8/3/23 (girl) 2023   • Preeclampsia     I had it with my first pregnancy   • Pregnancy     X2     Past Surgical History:   Procedure Laterality Date   • CARPAL TUNNEL RELEASE Bilateral    •  SECTION  2017   •  SECTION  2022   •  SECTION N/A 8/3/2023    Procedure:  SECTION REPEAT;  Surgeon: Duy Schmidt MD;  Location: [facility] LABOR DELIVERY;  Service: Obstetrics/Gynecology;  Laterality: N/A;   • LAPAROSCOPIC CHOLECYSTECTOMY     • WISDOM TOOTH EXTRACTION         Allergies   Allergen Reactions   • Nickel Rash       Family History and Social History reviewed and changed as necessary    REVIEW OF SYSTEM:   No new somatic complaints and no change in the color caliber or consistency of her stools other than darkening since taking the oral iron    PHYSICAL EXAM:  No jaundice  "icterus or pallor.  No respiratory distress.    Vitals:    12/13/24 1121   BP: 115/78   Pulse: 76   Resp: 14   Temp: 97.3 °F (36.3 °C)   SpO2: 100%   Weight: 57.2 kg (126 lb)   Height: 154.9 cm (61\")     Vitals:    12/13/24 1121   PainSc: 0-No pain          ECOG score: 0           Vitals reviewed.    ECOG: (0) Fully Active - Able to Carry On All Pre-disease Performance Without Restriction    Lab Results   Component Value Date    HGB 11.1 (L) 12/13/2024    HCT 35.1 12/13/2024    MCV 78.3 (L) 12/13/2024     12/13/2024    WBC 5.57 12/13/2024    NEUTROABS 2.81 12/13/2024    LYMPHSABS 2.35 12/13/2024    MONOSABS 0.31 12/13/2024    EOSABS 0.07 12/13/2024    BASOSABS 0.02 12/13/2024       Lab Results   Component Value Date    GLUCOSE 77 06/28/2024    BUN 8 06/28/2024    CREATININE 0.75 06/28/2024     06/28/2024    K 4.0 06/28/2024     06/28/2024    CO2 26.9 06/28/2024    CALCIUM 9.4 06/28/2024    PROTEINTOT 7.3 06/28/2024    ALBUMIN 4.0 06/28/2024    BILITOT <0.2 06/28/2024    ALKPHOS 66 06/28/2024    AST 21 06/28/2024    ALT 25 06/28/2024             ASSESSMENT & PLAN:  1.  Iron deficiency anemia intolerant of oral iron received IV iron while pregnant.    2.  Menorrhagia.  3.  History of preeclampsia with first pregnancy  4.  Migraines    -8/12/2024 [facility] hematology consult: Patient comes for evaluation of menorrhagia-induced iron deficiency anemia.  6/28/2024 hemoglobin 10.9 up from 8.0 June 2023 during pregnancy.  MCV 79 up from 70 during pregnancy.  Normal white count and platelet count and unremarkable differential.  Iron low 26 saturation 4% elevated total iron binding capacity 654.  Elevated transferrin 439.  Ferritin low 4.99.  CMP unremarkable.  Had previously taken IV iron during her pregnancy with Dr. Alessandro Miller in [place] without complications but does not know what type of IV iron she took.  She has not tried ferrous gluconate that she is aware of.  Will try her on that every other day " to maximize absorption and she will take FiberCon to combat constipation and if that does not do enough she will take MiraLAX.  Will check her iron studies and CBC today and just prior to return in 4 months.  If that does not work then we will try Venofer which her insurance should cover.  She does understand there is a risk of anaphylaxis that is greater with IV iron then with oral.    -- 12/13/2024 [facility] hematology follow-up: She did not get her labs prior to return.  Hemoglobin as she came in the door was 11.1 stable from August with MCV stable 78 suggesting persistent mild iron deficiency with ferritin and iron profile pending.  Both of these are up from hemoglobin in the tens back in June 2024 and August 2023.  For now she is feeling fairly fit and we will press on with the oral iron and I will see her in 6 months with CBC and iron indices prior to return.  If she is feeling worse and over the hemoglobin drops and/or the vaginal bleeding worsens which is still severe and we cannot keep up with the iron loss with oral iron, then we will consider Venofer.    Total time of care today inclusive of time spent today prior to patient's arrival reviewing interval data and during visit translating to patient putting forth a plan and after visit instituting this plan took 35 minutes patient care time throughout the day today.  Vasquez Meneses MD    12/13/2024

## 2024-12-13 NOTE — TELEPHONE ENCOUNTER
Left voicemail for patient to call back to discuss pap smear results and MD's plan of care (scheduling of colposcopy).

## 2024-12-13 NOTE — PROGRESS NOTES
CHIEF COMPLAINT: Iron deficiency anemia without fatigue    Problem List:  Oncology/Hematology History Overview Note   1.  Iron deficiency anemia intolerant of oral iron received IV iron while pregnant.    2.  Menorrhagia.  3.  History of preeclampsia with first pregnancy  4.  Migraines    -8/12/2024 [facility] consult: Patient comes for evaluation of menorrhagia-induced iron deficiency anemia.  6/28/2024 hemoglobin 10.9 up from 8.0 June 2023 during pregnancy.  MCV 79 up from 70 during pregnancy.  Normal white count and platelet count and unremarkable differential.  Iron low 26 saturation 4% elevated total iron binding capacity 654.  Elevated transferrin 439.  Ferritin low 4.99.  CMP unremarkable.  Had previously taken IV iron during her pregnancy with Dr. Alessandro Miller in [place] without complications but does not know what type of IV iron she took.  She has not tried ferrous gluconate that she is aware of.  Will try her on that every other day to maximize absorption and she will take FiberCon to combat constipation and if that does not do enough she will take MiraLAX.  Will check her iron studies and CBC today and just prior to return in 4 months.  If that does not work then we will try Venofer which her insurance should cover.  She does understand there is a risk of anaphylaxis that is greater with IV iron then with oral.    -- 12/13/2024 [facility] follow-up: She did not get her labs prior to return.  Hemoglobin as she came in the door was 11.1 stable from August with MCV stable 78 suggesting persistent mild iron deficiency with ferritin and iron profile pending.  Both of these are up from hemoglobin in the tens back in June 2024 and August 2023.  For now she is feeling fairly fit and we will press on with the oral iron and I will see her in 6 months with CBC and iron indices prior to return.  If she is feeling worse and over the hemoglobin drops and/or the vaginal bleeding worsens which is still  "severe and we cannot keep up with the iron loss with oral iron, then we will consider Venofer.     Iron deficiency anemia secondary to inadequate dietary iron intake       HISTORY OF PRESENT ILLNESS:  The patient is a 29 y.o. female, here for follow up on management of iron deficiency anemia due to heavy menses without fatigue    Past Medical History:   Diagnosis Date    Anemia     Anxiety     Migraine     I had them since i was a child    Pneumonia     Postpartum care following  delivery - 8/3/23 (girl) 2023    Preeclampsia     I had it with my first pregnancy    Pregnancy     X2     Past Surgical History:   Procedure Laterality Date    CARPAL TUNNEL RELEASE Bilateral      SECTION  2017     SECTION  2022     SECTION N/A 8/3/2023    Procedure:  SECTION REPEAT;  Surgeon: Duy Schmidt MD;  Location: [facility] LABOR DELIVERY;  Service: Obstetrics/Gynecology;  Laterality: N/A;    LAPAROSCOPIC CHOLECYSTECTOMY      WISDOM TOOTH EXTRACTION         Allergies   Allergen Reactions    Nickel Rash       Family History and Social History reviewed and changed as necessary    REVIEW OF SYSTEM:   No new somatic complaints and no change in the color caliber or consistency of her stools other than darkening since taking the oral iron    PHYSICAL EXAM:  No jaundice icterus or pallor.  No respiratory distress.    Vitals:    24 1121   BP: 115/78   Pulse: 76   Resp: 14   Temp: 97.3 °F (36.3 °C)   SpO2: 100%   Weight: 57.2 kg (126 lb)   Height: 154.9 cm (61\")     Vitals:    24 1121   PainSc: 0-No pain          ECOG score: 0           Vitals reviewed.    ECOG: (0) Fully Active - Able to Carry On All Pre-disease Performance Without Restriction    Lab Results   Component Value Date    HGB 11.1 (L) 2024    HCT 35.1 2024    MCV 78.3 (L) 2024     2024    WBC 5.57 2024    NEUTROABS 2.81 2024    LYMPHSABS 2.35 2024    " MONOSABS 0.31 12/13/2024    EOSABS 0.07 12/13/2024    BASOSABS 0.02 12/13/2024       Lab Results   Component Value Date    GLUCOSE 77 06/28/2024    BUN 8 06/28/2024    CREATININE 0.75 06/28/2024     06/28/2024    K 4.0 06/28/2024     06/28/2024    CO2 26.9 06/28/2024    CALCIUM 9.4 06/28/2024    PROTEINTOT 7.3 06/28/2024    ALBUMIN 4.0 06/28/2024    BILITOT <0.2 06/28/2024    ALKPHOS 66 06/28/2024    AST 21 06/28/2024    ALT 25 06/28/2024             ASSESSMENT & PLAN:  1.  Iron deficiency anemia intolerant of oral iron received IV iron while pregnant.    2.  Menorrhagia.  3.  History of preeclampsia with first pregnancy  4.  Migraines    -8/12/2024 [facility] consult: Patient comes for evaluation of menorrhagia-induced iron deficiency anemia.  6/28/2024 hemoglobin 10.9 up from 8.0 June 2023 during pregnancy.  MCV 79 up from 70 during pregnancy.  Normal white count and platelet count and unremarkable differential.  Iron low 26 saturation 4% elevated total iron binding capacity 654.  Elevated transferrin 439.  Ferritin low 4.99.  CMP unremarkable.  Had previously taken IV iron during her pregnancy with Dr. Alessandro Miller in [place] without complications but does not know what type of IV iron she took.  She has not tried ferrous gluconate that she is aware of.  Will try her on that every other day to maximize absorption and she will take FiberCon to combat constipation and if that does not do enough she will take MiraLAX.  Will check her iron studies and CBC today and just prior to return in 4 months.  If that does not work then we will try Venofer which her insurance should cover.  She does understand there is a risk of anaphylaxis that is greater with IV iron then with oral.    -- 12/13/2024 [facility] follow-up: She did not get her labs prior to return.  Hemoglobin as she came in the door was 11.1 stable from August with MCV stable 78 suggesting persistent mild iron deficiency with  ferritin and iron profile pending.  Both of these are up from hemoglobin in the tens back in June 2024 and August 2023.  For now she is feeling fairly fit and we will press on with the oral iron and I will see her in 6 months with CBC and iron indices prior to return.  If she is feeling worse and over the hemoglobin drops and/or the vaginal bleeding worsens which is still severe and we cannot keep up with the iron loss with oral iron, then we will consider Venofer.    Total time of care today inclusive of time spent today prior to patient's arrival reviewing interval data and during visit translating to patient putting forth a plan and after visit instituting this plan took 35 minutes patient care time throughout the day today.  Vasquez Meneses MD    12/13/2024

## 2024-12-16 ENCOUNTER — TELEPHONE (OUTPATIENT)
Dept: OBSTETRICS AND GYNECOLOGY | Facility: CLINIC | Age: 29
End: 2024-12-16
Payer: COMMERCIAL

## 2024-12-17 ENCOUNTER — TELEPHONE (OUTPATIENT)
Dept: OBSTETRICS AND GYNECOLOGY | Facility: CLINIC | Age: 29
End: 2024-12-17
Payer: COMMERCIAL

## 2024-12-17 NOTE — TELEPHONE ENCOUNTER
"Patient of Dr. Schmidt; LOV 09/25/24.  Patient did not see Dr. Schmidt' result note in MyChart from 10/07/24 until 12/13/24. \"Pap smear 9/25/2024 was negative.  HPV high risk Pool positive.  HPV 16/18/45 negative. Recommend colposcopy.\"  Voice message was left yesterday for patient to call office regarding need for colposcopy.  Attempted to return patient's call. Left voice message to call us back.   "

## 2025-01-17 ENCOUNTER — OFFICE VISIT (OUTPATIENT)
Dept: OBSTETRICS AND GYNECOLOGY | Facility: CLINIC | Age: 30
End: 2025-01-17
Payer: COMMERCIAL

## 2025-01-17 VITALS
HEIGHT: 61 IN | DIASTOLIC BLOOD PRESSURE: 76 MMHG | BODY MASS INDEX: 24.17 KG/M2 | SYSTOLIC BLOOD PRESSURE: 112 MMHG | WEIGHT: 128 LBS

## 2025-01-17 DIAGNOSIS — Z98.891 HISTORY OF CESAREAN SECTION: ICD-10-CM

## 2025-01-17 DIAGNOSIS — R87.810 CERVICAL HIGH RISK HUMAN PAPILLOMAVIRUS (HPV) DNA TEST POSITIVE: ICD-10-CM

## 2025-01-17 DIAGNOSIS — Z12.4 SCREENING FOR CERVICAL CANCER: ICD-10-CM

## 2025-01-17 DIAGNOSIS — Z30.011 VISIT FOR ORAL CONTRACEPTIVE PRESCRIPTION: ICD-10-CM

## 2025-01-17 DIAGNOSIS — B97.7 HPV IN FEMALE: Primary | ICD-10-CM

## 2025-01-17 DIAGNOSIS — D50.8 IRON DEFICIENCY ANEMIA SECONDARY TO INADEQUATE DIETARY IRON INTAKE: ICD-10-CM

## 2025-01-17 DIAGNOSIS — N92.1 MENORRHAGIA WITH IRREGULAR CYCLE: ICD-10-CM

## 2025-01-17 LAB
B-HCG UR QL: NEGATIVE
EXPIRATION DATE: NORMAL
INTERNAL NEGATIVE CONTROL: NORMAL
INTERNAL POSITIVE CONTROL: NORMAL
Lab: NORMAL

## 2025-01-17 NOTE — PROGRESS NOTES
"     Colposcopy Procedure Note        Colposcopy    Date/Time: 2025 12:17 PM    Performed by: Duy Schmidt MD  Authorized by: Duy Schmidt MD  Preparation: Patient was prepped and draped in the usual sterile fashion.  Local anesthesia used: no    Anesthesia:  Local anesthesia used: no    Sedation:  Patient sedated: no            Indications: Julia Blunt is a 29 y.o. female, , whose Patient's last menstrual period was 2024 (exact date)..  She presents for follow up for evaluation of an abnormal PAP smear that showed PAP negative with + High risk HPV . Prior cervical treatment includes: no treatment. She understands the need for the procedure and is aware of the complications, including post-colposcopic vaginal bleeding, vaginal leukorrhea or cervicitis.  She is aware she may experience discomfort.  After being presented with the risk, benefits, and alternatives the patient wished to proceed.      Urine pregnancy test done in the office today was Negative.      The patient has not had Gardasil.  She is not a smoker.      Procedure Details   The risks and benefits of the procedure and Verbal informed consent obtained. Time out: immediate members of the procedure team and patient agree to the following: correct patient, correct site, correct procedure to be performed.   Duy Schmidt MD      She was positioned in the dorsal lithotomy position and a speculum was inserted into the vagina and excellent visualization of cervix achieved, cervix swabbed x 3 with acetic acid solution. The transformation zone was completely visualized.  A cervical biopsy was not obtained at 000 o'clock.  An endocervical curettage was performed.  This colposcopy was satisfactory.     Findings:  The procedure was notable for:  Physical Exam  Genitourinary:            /76 (BP Location: Right arm)   Ht 154.9 cm (61\")   Wt 58.1 kg (128 lb)   LMP 2024 (Exact Date)   BMI 24.19 kg/m² "       Specimens: ECC  Specimens labelled and sent to Pathology.    Complications: none.    The patient tolerated the procedure very well and with mild discomfort and bleeding.    Assessment and Plan    Problem List Items Addressed This Visit          Gynecologic and Obstetric Problems    Menorrhagia with irregular cycle    Overview     2025.  Patient complains of continued frequent and heavy menstrual bleeding.  States did not tolerate Mirena IUD in the past.  Continue to have breakthrough bleeding on oral contraceptives currently has stopped everything.  Return for ultrasound.  May be candidate for hysteroscopy, and D&C and endometrial ablation.         Relevant Orders    US Non-ob Transvaginal       Other    Cervical high risk human papillomavirus (HPV) DNA test positive    Overview     Pap smear 2024 was negative.  HPV high risk Pool positive.  HPV 16/18/45 negative.  Needs colposcopy.         History of  section    Overview     History of  x 2.         Iron deficiency anemia secondary to inadequate dietary iron intake    Overview     2023; hematocrit 24.8%;  On iron sulfate 325 mg daily.    Iron profile consistent with iron deficiency.  3/13/2023; hematocrit improved to 30.6%  2023; hematocrit 26.1.  IV iron infusion 6/19/2023 x2  Increase iron to twice daily.         Relevant Medications    ferrous gluconate (FERGON) 324 MG tablet    Screening for cervical cancer    Overview     Last Pap ?? in [place]. Unsure date.     9/15/2023 Pap smear with atypical cells.  HPV high risk Pool was negative.  Pap smear 2024 was negative.  HPV high risk Pool positive.  HPV 16/18/45 negative.  Needs colposcopy.         Visit for oral contraceptive prescription    Overview     Being a heavy menses and breakthrough bleeding on Xulane patch.    2024 switched to Loestrin 1.5/.  Wishes to take in 2 to 3-month cycles to minimize menses.          Other Visit Diagnoses       HPV in  female    -  Primary    Relevant Orders    POC Pregnancy, Urine (Completed)    TISSUE EXAM, [facility] ([facility])            Will base further treatment on Pathology findings.  Treatment options discussed with patient.  Post biopsy instructions given to patient.  Since Pap smear was negative, if ECC negative can repeat Pap in 12 months.  Continued problems with abnormal and heavy bleeding which did not improve with oral contraceptives.  States she previously did not tolerate Mirena IUD.  Return for pelvic ultrasound.  May need hysteroscopy D&C and endometrial ablation.    Duy Schmidt MD  01/17/2025

## 2025-01-20 LAB — REF LAB TEST METHOD: NORMAL

## 2025-01-24 ENCOUNTER — TELEPHONE (OUTPATIENT)
Dept: OBSTETRICS AND GYNECOLOGY | Facility: CLINIC | Age: 30
End: 2025-01-24
Payer: COMMERCIAL

## 2025-02-03 ENCOUNTER — OFFICE VISIT (OUTPATIENT)
Dept: OBSTETRICS AND GYNECOLOGY | Facility: CLINIC | Age: 30
End: 2025-02-03
Payer: COMMERCIAL

## 2025-02-03 VITALS
SYSTOLIC BLOOD PRESSURE: 118 MMHG | DIASTOLIC BLOOD PRESSURE: 60 MMHG | WEIGHT: 127.8 LBS | HEIGHT: 61 IN | BODY MASS INDEX: 24.13 KG/M2

## 2025-02-03 DIAGNOSIS — N92.1 MENORRHAGIA WITH IRREGULAR CYCLE: Primary | ICD-10-CM

## 2025-02-03 DIAGNOSIS — Z98.891 HISTORY OF CESAREAN SECTION: ICD-10-CM

## 2025-02-03 DIAGNOSIS — D50.8 IRON DEFICIENCY ANEMIA SECONDARY TO INADEQUATE DIETARY IRON INTAKE: ICD-10-CM

## 2025-02-03 DIAGNOSIS — Z12.4 SCREENING FOR CERVICAL CANCER: ICD-10-CM

## 2025-02-03 DIAGNOSIS — R87.810 CERVICAL HIGH RISK HUMAN PAPILLOMAVIRUS (HPV) DNA TEST POSITIVE: ICD-10-CM

## 2025-02-03 RX ORDER — NORETHINDRONE ACETATE AND ETHINYL ESTRADIOL 1.5-30(21)
1 KIT ORAL DAILY
Qty: 84 TABLET | Refills: 3 | Status: SHIPPED | OUTPATIENT
Start: 2025-02-03 | End: 2026-02-03

## 2025-02-03 NOTE — PROGRESS NOTES
Chief Complaint   Patient presents with    Follow-up     US for AUB, Menorrhagia with irregular periods         Subjective   HPI  Julia Blunt is a 29 y.o. female, , her last LMP was Patient's last menstrual period was 2025..  She presents for a follow up evaluation of Abnormal Uterine Bleeding, Menorrhagia, and irregular cycles . The patient was last seen  2 weeks ago by Duy Schmidt MD. At that time she reported continued abnormal uterine bleeding, menorrhagia with irregular cycles. The plan was continuous OCP use and rtc if not improved for US. Since the last visit the patient reports the plan has remained unchanged. This has been an issue in the past. The patient reports additional symptoms as dyspareunia and lower abdominal bloating    US was done today.       Additional OB/GYN History   Last Pap : 24 Neg/ HPV Pool positive  Last Completed Pap Smear            PAP SMEAR (Every 3 Years) Next due on 2024  LIQUID-BASED PAP SMEAR WITH HPV GENOTYPING REGARDLESS OF INTERPRETATION ([facility])    09/15/2023  LIQUID-BASED PAP SMEAR WITH HPV GENOTYPING REGARDLESS OF INTERPRETATION ([facility])                  History of abnormal Pap smear: yes - HPV  Tobacco Usage?: No       Current Outpatient Medications:     ferrous gluconate (FERGON) 324 MG tablet, 1 tablet every other day.  90-day supply, Disp: 45 tablet, Rfl: 3    norethindrone-ethinyl estradiol-iron (Loestrin Fe 1.5/30) 1.5-30 MG-MCG tablet, Take 1 tablet by mouth Daily., Disp: 84 tablet, Rfl: 3     Past Medical History:   Diagnosis Date    Abnormal Pap smear of cervix     Abnormal uterine bleeding     Anemia     Anxiety     Irregular periods/menstrual cycles     Menorrhagia     Migraine     I had them since i was a child    Pneumonia     Preeclampsia     I had it with my first pregnancy        Past Surgical History:   Procedure Laterality Date    CARPAL TUNNEL RELEASE Bilateral       "SECTION  2017     SECTION  2022     SECTION N/A 8/3/2023    Procedure:  SECTION REPEAT;  Surgeon: Duy Schmidt MD;  Location: [facility] LABOR DELIVERY;  Service: Obstetrics/Gynecology;  Laterality: N/A;    LAPAROSCOPIC CHOLECYSTECTOMY      WISDOM TOOTH EXTRACTION         The additional following portions of the patient's history were reviewed and updated as appropriate: allergies, current medications, past family history, past medical history, past social history, past surgical history, and problem list.    Review of Systems   Genitourinary:  Positive for dyspareunia, menstrual problem and vaginal bleeding.        Lower abdominal bloating, menorrhagia, irregular cycles   All other systems reviewed and are negative.      I have reviewed and agree with the HPI, ROS, and historical information as entered above.   Duy Schmidt MD      Objective   /60   Ht 154.9 cm (61\")   Wt 58 kg (127 lb 12.8 oz)   LMP 2025   BMI 24.15 kg/m²     Physical Exam  Vitals and nursing note reviewed.   Constitutional:       Appearance: Normal appearance.   HENT:      Head: Normocephalic and atraumatic.   Neurological:      Mental Status: She is alert and oriented to person, place, and time.   Psychiatric:         Behavior: Behavior normal.         Assessment & Plan     Assessment     Problem List Items Addressed This Visit          Gynecologic and Obstetric Problems    Menorrhagia with irregular cycle - Primary    Overview     2025.  Patient complains of continued frequent and heavy menstrual bleeding.  States did not tolerate Mirena IUD in the past.  Continue to have breakthrough bleeding on oral contraceptives currently has stopped everything.  Return for ultrasound.  May be candidate for hysteroscopy, and D&C and endometrial ablation.    Ultrasound 2/3/2025 was negative for fibroids.  Will see if bleeding does not improve on higher progestin pill.  Rx for Loestrin 1. " sent to pharmacy.            Other    Cervical high risk human papillomavirus (HPV) DNA test positive    Overview     Pap smear 2024 was negative.  HPV high risk Pool POSITIVE.  HPV 16/18/45 negative.  Colposcopy 2025; ECC was negative for dysplasia.  Plan to repeat Pap smear with HPV and 12 months.         History of  section    Overview     History of  x 2.         Iron deficiency anemia secondary to inadequate dietary iron intake    Overview     2023; hematocrit 24.8%;  On iron sulfate 325 mg daily.    Iron profile consistent with iron deficiency.  3/13/2023; hematocrit improved to 30.6%  2023; hematocrit 26.1.  IV iron infusion 6/19/2023 x2  Increase iron to twice daily.         Relevant Medications    ferrous gluconate (FERGON) 324 MG tablet    Screening for cervical cancer    Overview     Last Pap ?? in [place]. Unsure date.     9/15/2023 Pap smear with atypical cells.  HPV high risk Pool was negative.    Pap smear 2024 was negative.  HPV high risk Pool POSITIVE.  HPV 16/18/45 negative.  2025 colposcopy was negative.  ECC was negative for dysplasia.  Plan to repeat Pap smear and HPV screen in 1 year.              Plan     Menorrhagia with regular menses.  Ultrasound today was negative for fibroids or polyps.  History of  x 3.  Patient states did not tolerate Mirena IUD well in the past.  Will see if menses improves on stronger dose OCP.    Rx for Loestrin 1. sent to pharmacy.  RTC in 2 to 3 months.  If no improvement will need to consider hysteroscopy, D&C and possible endometrial ablation.      Duy Schmidt MD  2025

## (undated) DEVICE — APPL CHLORAPREP TINTED 26ML TEAL

## (undated) DEVICE — SOL IRR H2O BTL 1000ML STRL

## (undated) DEVICE — TRY SPINE BLCK WHITACRE 25G 3X5IN

## (undated) DEVICE — SUT VIC 2/0 CT1 27IN J339H BX/36

## (undated) DEVICE — 4-PORT MANIFOLD: Brand: NEPTUNE 2

## (undated) DEVICE — PK C/SECT 10

## (undated) DEVICE — GLV SURG BIOGEL LTX PF 7 1/2

## (undated) DEVICE — SUT PLAIN  3/0 CT1 27IN 842H

## (undated) DEVICE — PATIENT RETURN ELECTRODE, SINGLE-USE, CONTACT QUALITY MONITORING, ADULT, WITH 9FT CORD, FOR PATIENTS WEIGING OVER 33LBS. (15KG): Brand: MEGADYNE

## (undated) DEVICE — CLTH CLENS READYCLEANSE PERI CARE PK/5

## (undated) DEVICE — TBG PENCL TELESCP MEGADYNE SMOKE EVAC 10FT

## (undated) DEVICE — COATED VICRYL  (POLYGLACTIN 910) SUTURE, VIOLET BRAIDED, STERILE, SYNTHETIC ABSORBABLE SUTURE: Brand: COATED VICRYL

## (undated) DEVICE — TRAP FLD MINIVAC MEGADYNE 100ML

## (undated) DEVICE — SOL IRR NACL 0.9PCT BT 1000ML

## (undated) DEVICE — PROXIMATE RH ROTATING HEAD SKIN STAPLERS (35 WIDE) CONTAINS 35 STAINLESS STEEL STAPLES: Brand: PROXIMATE

## (undated) DEVICE — SUT GUT CHRM 1 CTX 36IN 905H